# Patient Record
Sex: MALE | Race: WHITE | NOT HISPANIC OR LATINO | Employment: FULL TIME | URBAN - METROPOLITAN AREA
[De-identification: names, ages, dates, MRNs, and addresses within clinical notes are randomized per-mention and may not be internally consistent; named-entity substitution may affect disease eponyms.]

---

## 2017-12-06 ENCOUNTER — OFFICE VISIT (OUTPATIENT)
Dept: URGENT CARE | Facility: RETAIL CLINIC | Age: 27
End: 2017-12-06
Payer: COMMERCIAL

## 2017-12-06 VITALS — DIASTOLIC BLOOD PRESSURE: 77 MMHG | SYSTOLIC BLOOD PRESSURE: 122 MMHG | TEMPERATURE: 98 F | HEART RATE: 75 BPM

## 2017-12-06 DIAGNOSIS — L23.7 CONTACT DERMATITIS DUE TO POISON IVY: Primary | ICD-10-CM

## 2017-12-06 PROCEDURE — 99203 OFFICE O/P NEW LOW 30 MIN: CPT | Performed by: PHYSICIAN ASSISTANT

## 2017-12-06 RX ORDER — PREDNISONE 20 MG/1
TABLET ORAL
Qty: 14 TABLET | Refills: 0 | Status: SHIPPED | OUTPATIENT
Start: 2017-12-06 | End: 2017-12-18

## 2017-12-06 NOTE — PATIENT INSTRUCTIONS
Take oral steroid taper with food as directed  May take an oral over the counter antihistamine to help with itch for next 3-5 days (loratadine/Claritin, cetirizine/Zyrtec or Benadryl/diphenahydramine)  Wash area as soon as possible after contact with plant to avoid spread and reduce reaction. Washing in 5-10 minutes can prevent reaction but even if you wash 2 hours after exposure, you can significantly reduce the reaction.  Rash is not spread by fluid in the blisters.  Wash sheets, clothes and animals exposed to get rid of toxins.  Cool compresses, ice packs, cooler showers for itching relief.  Baking soda paste, calamine lotion or aveeno oatmeal packs for itching.  Rub with affected are with ice cube.  Avoid sunlight and heat.  Avoid scratching to prevent secondary infection.  Watch for any signs of infection - (fever, bright red color, more pain, discharge - yellow/white/green). Present to urgent care, your primary care clinic or ER if any of these signs develop to be evaluated.  Watch for other allergic reaction symptoms: including difficulty breathing, throat swelling and/or shortness of breath.  MUST follow up with primary care provider if not improving, worsening or new symptoms or for any adverse reactions to medications.

## 2017-12-06 NOTE — MR AVS SNAPSHOT
After Visit Summary   12/6/2017    Ross Dumas    MRN: [id number]           Patient Information     Date Of Birth          1990        Visit Information        Provider Department      12/6/2017 3:50 PM Rachele Ayala PA-C [facility]        Today's Diagnoses     Contact dermatitis due to poison ivy    -  1      Care Instructions    Take oral steroid taper with food as directed  May take an oral over the counter antihistamine to help with itch for next 3-5 days (loratadine/Claritin, cetirizine/Zyrtec or Benadryl/diphenahydramine)  Wash area as soon as possible after contact with plant to avoid spread and reduce reaction. Washing in 5-10 minutes can prevent reaction but even if you wash 2 hours after exposure, you can significantly reduce the reaction.  Rash is not spread by fluid in the blisters.  Wash sheets, clothes and animals exposed to get rid of toxins.  Cool compresses, ice packs, cooler showers for itching relief.  Baking soda paste, calamine lotion or aveeno oatmeal packs for itching.  Rub with affected are with ice cube.  Avoid sunlight and heat.  Avoid scratching to prevent secondary infection.  Watch for any signs of infection - (fever, bright red color, more pain, discharge - yellow/white/green). Present to urgent care, your primary care clinic or ER if any of these signs develop to be evaluated.  Watch for other allergic reaction symptoms: including difficulty breathing, throat swelling and/or shortness of breath.  MUST follow up with primary care provider if not improving, worsening or new symptoms or for any adverse reactions to medications.             Follow-ups after your visit        Who to contact     You can reach your care team any time of the day by calling [phone].  Notification of test results:  If you have an abnormal lab result, we will notify you by phone as soon as possible.         Additional Information About Your Visit       "  MyChart Information     [email or web address] lets you send messages to your doctor, view your test results, renew your prescriptions, schedule appointments and more. To sign up, go to [email or web address] . Click on \"Log in\" on the left side of the screen, which will take you to the Welcome page. Then click on \"Sign up Now\" on the right side of the page.     You will be asked to enter the access code listed below, as well as some personal information. Please follow the directions to create your username and password.     Your access code is: [email or web address]  Expires: 3/6/2018  4:15 PM     Your access code will  in 90 days. If you need help or a new code, please call your [facility] clinic or [phone].        Care EveryWhere ID     This is your Care EveryWhere ID. This could be used by other organizations to access your [facility] medical records  [id number]        Your Vitals Were     Pulse Temperature                75 98  F (36.7  C) (Temporal)           Blood Pressure from Last 3 Encounters:   17 122/77   12 125/74    Weight from Last 3 Encounters:   No data found for Wt              Today, you had the following     No orders found for display         Today's Medication Changes          These changes are accurate as of: 17  4:15 PM.  If you have any questions, ask your nurse or doctor.               Start taking these medicines.        Dose/Directions    predniSONE 20 MG tablet   Commonly known as:  DELTASONE   Used for:  Contact dermatitis due to poison ivy        Take 2 tablets once a day for 4 days, 1 tablet daily for 4 days, 1/2 tablet daily for 4 days   Quantity:  14 tablet   Refills:  0            Where to get your medicines      These medications were sent to [facility] - [place], [place] - [address] [address]  [address] [address] [address]     Phone:  [phone]     predniSONE 20 MG tablet                Primary Care Provider Fax #    Physician No Ref-Primary [phone]    "    No address on file        Equal Access to Services     RD ANDREA : Hadii jeffrey joyner lucía Sharma, wajadenda luqadaha, qaybta kaalmapro dobson, waxwarren natividad alfaro. So [facility] [phone].    ATENCIÓN: Si habla español, tiene a petty disposición servicios gratuitos de asistencia lingüística. Llame al [phone].    We comply with applicable federal civil rights laws and [place] laws. We do not discriminate on the basis of race, color, national origin, age, disability, sex, sexual orientation, or gender identity.            Thank you!     Thank you for choosing [facility]  for your care. Our goal is always to provide you with excellent care. Hearing back from our patients is one way we can continue to improve our services. Please take a few minutes to complete the written survey that you may receive in the mail after your visit with us. Thank you!             Your Updated Medication List - Protect others around you: Learn how to safely use, store and throw away your medicines at www.disposemymeds.org.          This list is accurate as of: 12/6/17  4:15 PM.  Always use your most recent med list.                   Brand Name Dispense Instructions for use Diagnosis    predniSONE 20 MG tablet    DELTASONE    14 tablet    Take 2 tablets once a day for 4 days, 1 tablet daily for 4 days, 1/2 tablet daily for 4 days    Contact dermatitis due to poison ivy

## 2017-12-06 NOTE — PROGRESS NOTES
Chief Complaint   Patient presents with     Derm Problem     rash around right eye x 3 days, no fevers      SUBJECTIVE:  Ross Dumas is a 27 year old male who presents to the clinic today for a rash.  Onset of rash was 2 day(s) ago.   Rash is gradual onset, still present and worsening.  Location of the rash: around his eye  Quality/symptoms of rash: swollen, itch  Symptoms are moderate and rash seems to be worsening.  Previous history of a similar rash? Yes: multiple times of poison ivy - mild cases this year on his arms - treats at home, washes off with soap/water, home remedies  Recent exposure history: poison ivy  Associated symptoms include: nothing.    No past medical history on file.  No current outpatient prescriptions on file.        Allergies   Allergen Reactions     Seasonal Allergies         History   Smoking Status     Never Smoker   Smokeless Tobacco     Never Used       ROS:  CONSTITUTIONAL:NEGATIVE for fever, chills  INTEGUMENTARY/SKIN: NEGATIVE for bruising  and POSITIVE for itchy/swollen rash on face  RESP:NEGATIVE for significant cough or wheezing    EXAM:   /77 (BP Location: Left arm)  Pulse 75  Temp 98  F (36.7  C) (Temporal)  GENERAL: alert, no acute distress.  SKIN: Rash description:    Distribution: localized  Location: bridge of nose and around right eye  Color: pink,  Lesion type: vesicular, blotchy, confluent with inflammation and excoriation  NECK: supple, non-tender to palpation, no adenopathy noted    ASSESSMENT:  (L23.7) Contact dermatitis due to poison ivy  (primary encounter diagnosis)    PLAN:  Plan: predniSONE (DELTASONE) 20 MG tablet - 12 day taper  Take oral steroid taper with food as directed  May take an oral over the counter antihistamine to help with itch for next 3-5 days (loratadine/Claritin, cetirizine/Zyrtec or Benadryl/diphenahydramine)  Wash area as soon as possible after contact with plant to avoid spread and reduce reaction. Washing in 5-10 minutes can  prevent reaction but even if you wash 2 hours after exposure, you can significantly reduce the reaction.  Rash is not spread by fluid in the blisters.  Wash sheets, clothes and animals exposed to get rid of toxins.  Cool compresses, ice packs, cooler showers for itching relief.  Baking soda paste, calamine lotion or aveeno oatmeal packs for itching.  Rub with affected are with ice cube.  Avoid sunlight and heat.  Avoid scratching to prevent secondary infection.  Watch for any signs of infection - (fever, bright red color, more pain, discharge - yellow/white/green). Present to urgent care, your primary care clinic or ER if any of these signs develop to be evaluated.  Watch for other allergic reaction symptoms: including difficulty breathing, throat swelling and/or shortness of breath.  MUST follow up with primary care provider if not improving, worsening or new symptoms or for any adverse reactions to medications.        Rachele Ayala PA-C  [facility]

## 2017-12-06 NOTE — NURSING NOTE
Chief Complaint   Patient presents with     Derm Problem     rash around right eye x 3 days, no fevers       Initial /77 (BP Location: Left arm)  Pulse 75  Temp 98  F (36.7  C) (Temporal) There is no height or weight on file to calculate BMI.  Medication Reconciliation: complete

## 2018-10-19 ENCOUNTER — OFFICE VISIT (OUTPATIENT)
Dept: FAMILY MEDICINE | Facility: OTHER | Age: 28
End: 2018-10-19
Payer: COMMERCIAL

## 2018-10-19 VITALS
WEIGHT: 211.8 LBS | BODY MASS INDEX: 32.1 KG/M2 | TEMPERATURE: 98.8 F | HEART RATE: 100 BPM | SYSTOLIC BLOOD PRESSURE: 114 MMHG | DIASTOLIC BLOOD PRESSURE: 82 MMHG | HEIGHT: 68 IN | RESPIRATION RATE: 16 BRPM

## 2018-10-19 DIAGNOSIS — G44.89 OTHER HEADACHE SYNDROME: Primary | ICD-10-CM

## 2018-10-19 DIAGNOSIS — R11.0 NAUSEA: ICD-10-CM

## 2018-10-19 LAB
ALBUMIN SERPL-MCNC: 4.3 G/DL (ref 3.4–5)
ALP SERPL-CCNC: 89 U/L (ref 40–150)
ALT SERPL W P-5'-P-CCNC: 45 U/L (ref 0–70)
ANION GAP SERPL CALCULATED.3IONS-SCNC: 11 MMOL/L (ref 3–14)
AST SERPL W P-5'-P-CCNC: 22 U/L (ref 0–45)
BILIRUB SERPL-MCNC: 0.6 MG/DL (ref 0.2–1.3)
BUN SERPL-MCNC: 12 MG/DL (ref 7–30)
CALCIUM SERPL-MCNC: 9 MG/DL (ref 8.5–10.1)
CHLORIDE SERPL-SCNC: 104 MMOL/L (ref 94–109)
CO2 SERPL-SCNC: 26 MMOL/L (ref 20–32)
CREAT SERPL-MCNC: 1.12 MG/DL (ref 0.66–1.25)
ERYTHROCYTE [DISTWIDTH] IN BLOOD BY AUTOMATED COUNT: 13 % (ref 10–15)
GFR SERPL CREATININE-BSD FRML MDRD: 78 ML/MIN/1.7M2
GLUCOSE SERPL-MCNC: 124 MG/DL (ref 70–99)
HCT VFR BLD AUTO: 45.7 % (ref 40–53)
HGB BLD-MCNC: 16.1 G/DL (ref 13.3–17.7)
MCH RBC QN AUTO: 29.4 PG (ref 26.5–33)
MCHC RBC AUTO-ENTMCNC: 35.2 G/DL (ref 31.5–36.5)
MCV RBC AUTO: 84 FL (ref 78–100)
PLATELET # BLD AUTO: 229 10E9/L (ref 150–450)
POTASSIUM SERPL-SCNC: 3.8 MMOL/L (ref 3.4–5.3)
PROT SERPL-MCNC: 8.3 G/DL (ref 6.8–8.8)
RBC # BLD AUTO: 5.47 10E12/L (ref 4.4–5.9)
SODIUM SERPL-SCNC: 141 MMOL/L (ref 133–144)
WBC # BLD AUTO: 8.5 10E9/L (ref 4–11)

## 2018-10-19 PROCEDURE — 80053 COMPREHEN METABOLIC PANEL: CPT | Performed by: NURSE PRACTITIONER

## 2018-10-19 PROCEDURE — 36415 COLL VENOUS BLD VENIPUNCTURE: CPT | Performed by: NURSE PRACTITIONER

## 2018-10-19 PROCEDURE — 85027 COMPLETE CBC AUTOMATED: CPT | Performed by: NURSE PRACTITIONER

## 2018-10-19 PROCEDURE — 99214 OFFICE O/P EST MOD 30 MIN: CPT | Performed by: NURSE PRACTITIONER

## 2018-10-19 RX ORDER — ONDANSETRON 8 MG/1
8 TABLET, FILM COATED ORAL EVERY 8 HOURS PRN
Qty: 9 TABLET | Refills: 0 | Status: SHIPPED | OUTPATIENT
Start: 2018-10-19 | End: 2020-10-08

## 2018-10-19 RX ORDER — SUMATRIPTAN 25 MG/1
25-50 TABLET, FILM COATED ORAL
Qty: 18 TABLET | Refills: 0 | Status: SHIPPED | OUTPATIENT
Start: 2018-10-19 | End: 2020-10-08

## 2018-10-19 ASSESSMENT — PAIN SCALES - GENERAL: PAINLEVEL: NO PAIN (1)

## 2018-10-19 NOTE — MR AVS SNAPSHOT
"              After Visit Summary   10/19/2018    Ross Dumas    MRN: [id number]           Patient Information     Date Of Birth          1990        Visit Information        Provider Department      10/19/2018 1:20 PM Carly Tracey APRN CNP [facility]        Today's Diagnoses     Other headache syndrome    -  1    Nausea          Care Instructions    Continue to sip water adding electrolyte beverage as able. Zofran for nausea. Imitrex for abortive of headache that is severe. Ibuprofen 600-800 mg 6 - 8 hours with food.     I will call you with your lab results and any further treatment as indicated.     Thank you  Carly Tracey CNP            Follow-ups after your visit        Who to contact     If you have questions or need follow up information about today's clinic visit or your schedule please contact [facility] directly at [phone].  Normal or non-critical lab and imaging results will be communicated to you by MyChart, letter or phone within 4 business days after the clinic has received the results. If you do not hear from us within 7 days, please contact the clinic through MyChart or phone. If you have a critical or abnormal lab result, we will notify you by phone as soon as possible.  Submit refill requests through [employer] or call your pharmacy and they will forward the refill request to us. Please allow 3 business days for your refill to be completed.          Additional Information About Your Visit        Care EveryWhere ID     This is your Care EveryWhere ID. This could be used by other organizations to access your [place] medical records  [id number]        Your Vitals Were     Pulse Temperature Respirations Height BMI (Body Mass Index)       100 98.8  F (37.1  C) (Temporal) 16 5' 7.79\" (1.722 m) 32.4 kg/m2        Blood Pressure from Last 3 Encounters:   10/19/18 114/82   12/06/17 122/77   04/03/12 125/74    Weight from Last 3 Encounters: "   10/19/18 211 lb 12.8 oz (96.1 kg)              We Performed the Following     CBC with platelets     Comprehensive metabolic panel (BMP + Alb, Alk Phos, ALT, AST, Total. Bili, TP)          Today's Medication Changes          These changes are accurate as of 10/19/18  2:04 PM.  If you have any questions, ask your nurse or doctor.               Start taking these medicines.        Dose/Directions    ondansetron 8 MG tablet   Commonly known as:  ZOFRAN   Used for:  Nausea   Started by:  Carly Tracey APRN CNP        Dose:  8 mg   Take 1 tablet (8 mg) by mouth every 8 hours as needed for nausea   Quantity:  9 tablet   Refills:  0       SUMAtriptan 25 MG tablet   Commonly known as:  IMITREX   Used for:  Other headache syndrome   Started by:  Carly Tracey APRN CNP        Dose:  25-50 mg   Take 1-2 tablets (25-50 mg) by mouth at onset of headache for migraine May repeat in 2 hours. Max 8 tablets/24 hours.   Quantity:  18 tablet   Refills:  0            Where to get your medicines      These medications were sent to [facility] [place], [place] - [address]   [address] [place] [facility] [address]     Phone:  [phone]     ondansetron 8 MG tablet    SUMAtriptan 25 MG tablet                Primary Care Provider Fax #    Physician No Ref-Primary [phone]       No address on file        Equal Access to Services     BARAK MENDEZ AH: Hadii jeffrey joyner hadasho Soomaali, waaxda luqadaha, qaybta kaalmada adeegyada, gabriel alfaro. So [facility] [phone].    ATENCIÓN: Si habla español, tiene a petty disposición servicios gratuitos de asistencia lingüística. Fauzia leiva [phone].    We comply with applicable federal civil rights laws and [place] laws. We do not discriminate on the basis of race, color, national origin, age, disability, sex, sexual orientation, or gender identity.            Thank you!     Thank you for choosing [facility]  your care. Our goal is always to provide you with excellent care. Hearing back from our patients is one way we can continue to improve our services. Please take a few minutes to complete the written survey that you may receive in the mail after your visit with us. Thank you!             Your Updated Medication List - Protect others around you: Learn how to safely use, store and throw away your medicines at www.disposemymeds.org.          This list is accurate as of 10/19/18  2:04 PM.  Always use your most recent med list.                   Brand Name Dispense Instructions for use Diagnosis    ondansetron 8 MG tablet    ZOFRAN    9 tablet    Take 1 tablet (8 mg) by mouth every 8 hours as needed for nausea    Nausea       SUMAtriptan 25 MG tablet    IMITREX    18 tablet    Take 1-2 tablets (25-50 mg) by mouth at onset of headache for migraine May repeat in 2 hours. Max 8 tablets/24 hours.    Other headache syndrome

## 2018-10-19 NOTE — PROGRESS NOTES
SUBJECTIVE:   Ross Dumas is a 28 year old male who presents to clinic today for the following health issues:    HPI  Headaches      Duration: a week and a half    Description  Location: unilateral in the right frontal area, unilateral in the right temporal area   Character: sharp pain  Frequency:  On and off, had a migraine last week Wednesday and threw up all day on Thursday.   Duration:  Thirty minutes.     Intensity:  mild    Accompanying signs and symptoms:    Precipitating or Alleviating factors:  Nausea/vomiting: yes, vomited on Thursday and end of the day on Friday.   Dizziness: occasionally.   Weakness or numbness: no  Visual changes: none  Fever: no   Sinus or URI symptoms no     History  Head trauma: no   Family history of migraines: not that he is aware.   Previous tests for headaches: no   Neurologist evaluations: no   Able to do daily activities when headache present: YES- for the most part.  Wake with headaches: YES on Monday and Wednesday this week.   Daily pain medication use: YES- tylenol and ibuprofen.   Any changes in: none    Precipitating or Alleviating factors (light/sound/sleep/caffeine): none     Therapies tried and outcome: Ibuprofen (Advil, Motrin) and Tylenol    Outcome - usually effective  Frequent/daily pain medication use: no     States he had history of bad headache after concussion when 17 y/o.   States symptoms of headache came on all of a sudden on drive home from work. Denies sensitivity to sound, smell or light. [place] area, severe, pain with movement.   State improved to tolerable after Nsaids 400 mg of ibuprofen. He was then able to drive his children home. He was able to work at home for some time then went to bed at 11 pm. He then took Imitrex he was not able to sleep due to nausea and vomiting. He vomited all night. Had chills/hot cold on and off all that day. He took imitrex again that night due to sever headache. The next day he was very nauseated but did not vomit.  Denies diarrhea. Headache was improving he took ibuprofen.   He has had a headache on and off all week with nausea for past 6 additional days and last night severe headache again this time it was on the right side of his head.     He is able to keep down saltine crackers and soup. He is sipping on water. Unable to keep water or other electrolytes down until earlier this week.     He typically drinks 5-6 bottles of water daily, coffee 1-2 cup in morning, typically eats good meals. Denies tobacco, rare etoh.         Problem list and histories reviewed & adjusted, as indicated.  Additional history: as documented    Patient Active Problem List   Diagnosis     Allergic conjunctivitis     Allergic rhinitis due to allergen     Biceps tendonitis on left     Seasonal allergies     Past Surgical History:   Procedure Laterality Date     HC KNEE SCOPE,AID ANT CRUCIATE REPAIR  11/23/2004    Ruptured ACL graft, hx of allograft ACL reconstruction with small 5 mm longitudinal tear, mid posterior aspect of lateral meniscus.       Social History   Substance Use Topics     Smoking status: Never Smoker     Smokeless tobacco: Never Used     Alcohol use No     History reviewed. No pertinent family history.      Current Outpatient Prescriptions   Medication Sig Dispense Refill     ondansetron (ZOFRAN) 8 MG tablet Take 1 tablet (8 mg) by mouth every 8 hours as needed for nausea 9 tablet 0     SUMAtriptan (IMITREX) 25 MG tablet Take 1-2 tablets (25-50 mg) by mouth at onset of headache for migraine May repeat in 2 hours. Max 8 tablets/24 hours. 18 tablet 0     Allergies   Allergen Reactions     Seasonal Allergies      BP Readings from Last 3 Encounters:   10/19/18 114/82   12/06/17 122/77   04/03/12 125/74    Wt Readings from Last 3 Encounters:   10/19/18 211 lb 12.8 oz (96.1 kg)                  Labs reviewed in EPIC    ROS:  Constitutional, HEENT, cardiovascular, pulmonary, gi and gu systems are negative, except as otherwise  "noted.    OBJECTIVE:     /82  Pulse 100  Temp 98.8  F (37.1  C) (Temporal)  Resp 16  Ht 5' 7.79\" (1.722 m)  Wt 211 lb 12.8 oz (96.1 kg)  BMI 32.4 kg/m2  Body mass index is 32.4 kg/(m^2).  GENERAL: healthy, alert and no distress  EYES: Eyes grossly normal to inspection, PERRL and conjunctivae and sclerae normal  HENT: ear canals and TM's normal, nose and mouth without ulcers or lesions  NECK: no adenopathy, no asymmetry, masses, or scars and thyroid normal to palpation  RESP: lungs clear to auscultation - no rales, rhonchi or wheezes  CV: regular rate and rhythm, normal S1 S2, no S3 or S4, no murmur, click or rub, no peripheral edema and peripheral pulses strong  ABDOMEN: soft, nontender, no hepatosplenomegaly, no masses and bowel sounds normal  MS: no gross musculoskeletal defects noted, no edema  SKIN: no suspicious lesions or rashes  NEURO: Normal strength and tone, mentation intact and speech normal  BACK: no CVA tenderness, no paralumbar tenderness  PSYCH: mentation appears normal, affect normal/bright        ASSESSMENT/PLAN:     1. Other headache syndrome  Unclear etiology suspect viral. Discussed home treatment will return to clinic if symptoms do not continue to improve.   - CBC with platelets  - Comprehensive metabolic panel (BMP + Alb, Alk Phos, ALT, AST, Total. Bili, TP)  - SUMAtriptan (IMITREX) 25 MG tablet; Take 1-2 tablets (25-50 mg) by mouth at onset of headache for migraine May repeat in 2 hours. Max 8 tablets/24 hours.  Dispense: 18 tablet; Refill: 0    2. Nausea    - ondansetron (ZOFRAN) 8 MG tablet; Take 1 tablet (8 mg) by mouth every 8 hours as needed for nausea  Dispense: 9 tablet; Refill: 0    Patient Instructions   Continue to sip water adding electrolyte beverage as able. Zofran for nausea. Imitrex for abortive of headache that is severe. Ibuprofen 600-800 mg 6 - 8 hours with food.     I will call you with your lab results and any further treatment as indicated.     Thank " you  Carly Tracey, LENARD BOOKER  [facility]

## 2018-10-19 NOTE — PATIENT INSTRUCTIONS
Continue to sip water adding electrolyte beverage as able. Zofran for nausea. Imitrex for abortive of headache that is severe. Ibuprofen 600-800 mg 6 - 8 hours with food.     I will call you with your lab results and any further treatment as indicated.     Thank you  Carly Tracey CNP

## 2018-10-22 ENCOUNTER — TELEPHONE (OUTPATIENT)
Dept: FAMILY MEDICINE | Facility: OTHER | Age: 28
End: 2018-10-22

## 2018-10-22 NOTE — PROGRESS NOTES
Please advise Ross Dumas,  1990, that his lab results were normal electrolytes, glucose is mildly elevated at 124 (normal is 70-99). Normal kidney and liver function. Complete blood count is normal without indication of infection or anemia.   [phone] (home)   Thank you  Carly Tracey CNP

## 2018-10-22 NOTE — TELEPHONE ENCOUNTER
LM asking patient to return our call.  Please inform him of the message below.  Soren Mccallum, SHYANN    Please advise Ross Dumas,  1990, that his lab results were normal electrolytes, glucose is mildly elevated at 124 (normal is 70-99). Normal kidney and liver function. Complete blood count is normal without indication of infection or anemia.   [phone] (home)   Thank you   Carly Tracey CNP

## 2019-05-02 ENCOUNTER — OFFICE VISIT (OUTPATIENT)
Dept: URGENT CARE | Facility: RETAIL CLINIC | Age: 29
End: 2019-05-02
Payer: COMMERCIAL

## 2019-05-02 VITALS
HEART RATE: 75 BPM | TEMPERATURE: 98.3 F | OXYGEN SATURATION: 97 % | DIASTOLIC BLOOD PRESSURE: 90 MMHG | SYSTOLIC BLOOD PRESSURE: 125 MMHG

## 2019-05-02 DIAGNOSIS — L23.7 CONTACT DERMATITIS DUE TO POISON IVY: Primary | ICD-10-CM

## 2019-05-02 PROCEDURE — 99213 OFFICE O/P EST LOW 20 MIN: CPT | Performed by: INTERNAL MEDICINE

## 2019-05-02 RX ORDER — PREDNISONE 20 MG/1
20 TABLET ORAL DAILY
Qty: 5 TABLET | Refills: 0 | Status: SHIPPED | OUTPATIENT
Start: 2019-05-02 | End: 2019-05-30

## 2019-05-02 NOTE — PROGRESS NOTES
[facility] care Progress Note        Susan Rahman MD, MPH  05/02/2019    Ross Dumas is a pleasant  29 year old male seen for rash involving bilateral hands and forearms as well as lower legs and to a lesser extent face for 4 days . Patient was removing brushes from his mouse 6 days ago and thinks he was exposed to poison IVY.There has not been any change in the diet or new detergent, soap or toiletries.  No new medications, no insect bite. No fever or chills and no recent illness. No cough or shortness of breath or wheezing is referred.  No nausea, vomiting or diarrhea.  No arthralgia or myalgia.  No tongue, lip or mouth swelling or swallowing problems are referred.    Physical Exam   /90   Pulse 75   Temp 98.3  F (36.8  C) (Oral)   SpO2 97%      Constitutional: Patient is in no distress The patient is pleasant and cooperative.   HEENT: Head:  Head is atraumatic, normocephalic.    Eyes: Pupils are equal, round and reactive to light and accomodation.  Sclera is non-icteric. No conjunctival injection, or exudate noted. Extraocular motion is intact. Visual acuity is intact bilaterally.  Ears:  External acoustic canals are patent and clear.  There is no erythema and bulging( exudate)  of the ( R/L ) tympanic membrane(s ).   Nose:  No Nasal congestion w/o drainage or mucosal ulceration is noted.  Throat:  Oral mucosa is moist.  No oral lesions are noted.  No posterior pharyngeal hyperemia or exudate noted.     Neck Supple.  There is no cervical lymphadenopathy.  No nuchal rigidity noted.  There is no meningismus.     Cardiovascular: Heart is regular to rate and rhythm.  No murmur is noted.     Chest. Chest Symmetrical, no soft tissues, swelling, or tenderness upon palpation   Lungs: Clear in the anterior and posterior pulmonary fields.   Abdomen: Soft and non-tender.    Back No flank tenderness is noted.   Extremeties No edema, no calf tenderness.   Neuro: No focal deficit.   Skin No  petechiae or purpura is noted.  There is excoriation of the skin of bilateral hands ( dorsally) and forearms ( extensor surfaces) and the anterior surface of bilateral lower legs and to a lesser extent face (cheeks) sparing periocular skin. There is no pustules, and no vesicular eruption. No dermatomal pattern of distribution of the rash. No crusty or exudative skin lesions. No ulcerations. No lymphangitis.      Mood Normal         Assessment & Plan        Contact dermatitis due to poison ivy    - predniSONE (DELTASONE) 20 MG tablet; Take 20 mg by mouth daily for 5 days.  Dispense: 5 tablet; Refill: 0   discussed potential adverse effects of prednisone w the patient.    Follow-up with your PCP in 4-5 days, earlier if symptoms worsen.  Advised to use plenty of moisturizing cream.  Advised to avoid hot baths/showers.  Advised to avoid irritating soap/detergents.  Avoid warm environments.  Use Benedryl every 8 hours as needed for pruritis ( discussed the sedative nature of benadryl and advised patient to avoid operating equipment/machinery and caution with driving is advised ).  Please wash the skin with soap and water daily.

## 2019-05-28 NOTE — PROGRESS NOTES
"Subjective     Ross Dumas is a 29 year old male who presents to clinic today for the following health issues:    History of Present Illness     Anxiety symptoms  The patient is not having other symptoms associated with anxiety.  Any significant life events: relationship concerns  Patient is feeling anxious or having panic attacks.  Patient has no concerns about alcohol or drug use.      Social History  Tobacco Use    Smoking status: Never Smoker    Smokeless tobacco: Never Used  Alcohol use: No  Drug use: No      Today's PHQ-9         PHQ-9 Total Score:     15   PHQ-9 Q9 Thoughts of better off dead/self-harm past 2 weeks :   Not at all   Thoughts of suicide or self harm:      Self-harm Plan:        Self-harm Action:          Safety concerns for self or others:         Abnormal Mood Symptoms  Onset: last couple weeks    Description:   Depression: no  Anxiety: YES    Accompanying Signs & Symptoms:  Still participating in activities that you used to enjoy: YES   Fatigue: YES  Irritability: YES  Difficulty concentrating: YES  Changes in appetite: YES- \"maybe eating less\"  Problems with sleep: YES - troubles falling asleep - waking up at 1:30 AM even with wife's sleeping pills (trazadone)  Heart racing/beating fast : YES  Thoughts of hurting yourself or others: none    History:   Recent stress: YES- job is stressful, family things  Prior depression hospitalization: None  Family history of depression: no  Family history of anxiety: no    Precipitating factors:   Alcohol/drug use: no    Alleviating factors:  Sertraline possibly (wife's medication - when started taking that, that's when he started waking up in the middle of the night)    Therapies Tried and outcome: Wife's medication - Zoloft (Sertraline) and Trazodone     He states that for the past 3 weeks, he has had increased anxiety and difficulty falling asleep and staying asleep. It all stemmed from news he was recently told regarding some things in his childhood " "that he never knew about and he does not wish to discuss details but this has taken a big toll on him. His mind is constantly racing throughout the day and he has been waking up at 1:30 every night for the past week or so. He started his wife's Zoloft approximately 1 week ago and thinks this is helping with his anxiety symptoms but he was not waking up part way through the night before starting this medication. He has tried his wife's trazodone without benefit. He did take Benadryl a couple night and he was able to fall back asleep after about an hour. He denies depression or thoughts of self harm. He is interested in counseling. He denies any substance abuse. He drinks alcohol occasionally on the weekends.     Answers for HPI/ROS submitted by the patient on 5/30/2019   Chronic problems general questions HPI Form  If you checked off any problems, how difficult have these problems made it for you to do your work, take care of things at home, or get along with other people?: Somewhat difficult  PHQ9 TOTAL SCORE: 15  OLIVIER 7 TOTAL SCORE: 18    Reviewed and updated as needed this visit by Provider  FH/SH/PMH reviewed    Review of Systems   GENERAL: Denies fever, fatigue, weakness, weight gain, or weight loss.  CARDIOVASCULAR: Denies chest pain, shortness of breath, irregular heartbeats,  palpitations, or edema.  RESPIRATORY: Denies cough, hemoptysis, and shortness of breath.  NEUROLOGIC: Denies headache, fainting, dizziness, memory loss, numbness, tingling, or seizures.  PSYCHIATRIC:  +Anxiety, difficulty sleeping. Denies depression or thoughts of suicide.        Objective    /76   Pulse 108   Temp 97.6  F (36.4  C) (Temporal)   Resp 16   Ht 1.715 m (5' 7.52\")   Wt 91.6 kg (202 lb)   SpO2 97%   BMI 31.15 kg/m    Body mass index is 31.15 kg/m .  Physical Exam   GENERAL: healthy, alert and no distress  RESP: lungs clear to auscultation - no rales, rhonchi or wheezes  CV: regular rate and rhythm, normal S1 S2, " no S3 or S4, no murmur, click or rub  NEURO: Normal strength and tone, mentation intact and speech normal. Gait is stable.   PSYCH: mentation appears normal, affect normal/bright        Assessment & Plan       ICD-10-CM    1. Adjustment disorder with anxious mood F43.22 sertraline (ZOLOFT) 50 MG tablet     hydrOXYzine (VISTARIL) 50 MG capsule   2. Psychophysiological insomnia F51.04    3. Elevated glucose R73.09 Hemoglobin A1c       1-2. His symptoms are consistent with an adjustment disorder with anxiety due to recent information he was told regarding his childhood. He has already started his wife's sertraline which has been working well, although he has been waking up part way through the night since starting this but this could be related to his anxiety rather than a medication side effect. I recommend he continue taking the sertraline 50 mg daily and will fill a prescription for him. Common side effects discussed and he was notified that this can take 4-6 weeks to take full effect. He will continue taking this in the mornings and for his insomnia symptoms, will start him on Vistaril  mg nightly which should help calm down his anxious thoughts as well. If insomnia is not improving, may need to consider a different SSRI. I also recommend counseling and his states his wife sees someone locally so he will check into this. If he needs a referral, he will let me know. Will plan on follow up in 6 weeks for a recheck.    3. His glucose was elevated at 124 in October and he states he was fasting during that visit so A1c ordered today and found to be normal.       Return in about 6 weeks (around 7/11/2019) for Routine Visit.    Rikki Strange PA-C  [facility]

## 2019-05-30 ENCOUNTER — OFFICE VISIT (OUTPATIENT)
Dept: FAMILY MEDICINE | Facility: OTHER | Age: 29
End: 2019-05-30
Payer: COMMERCIAL

## 2019-05-30 VITALS
OXYGEN SATURATION: 97 % | RESPIRATION RATE: 16 BRPM | HEIGHT: 68 IN | BODY MASS INDEX: 30.62 KG/M2 | TEMPERATURE: 97.6 F | DIASTOLIC BLOOD PRESSURE: 76 MMHG | SYSTOLIC BLOOD PRESSURE: 120 MMHG | HEART RATE: 108 BPM | WEIGHT: 202 LBS

## 2019-05-30 DIAGNOSIS — F51.04 PSYCHOPHYSIOLOGICAL INSOMNIA: ICD-10-CM

## 2019-05-30 DIAGNOSIS — F43.22 ADJUSTMENT DISORDER WITH ANXIOUS MOOD: Primary | ICD-10-CM

## 2019-05-30 DIAGNOSIS — R73.09 ELEVATED GLUCOSE: ICD-10-CM

## 2019-05-30 LAB — HBA1C MFR BLD: 5.1 % (ref 0–5.6)

## 2019-05-30 PROCEDURE — 83036 HEMOGLOBIN GLYCOSYLATED A1C: CPT | Performed by: PHYSICIAN ASSISTANT

## 2019-05-30 PROCEDURE — 99214 OFFICE O/P EST MOD 30 MIN: CPT | Performed by: PHYSICIAN ASSISTANT

## 2019-05-30 PROCEDURE — 36415 COLL VENOUS BLD VENIPUNCTURE: CPT | Performed by: PHYSICIAN ASSISTANT

## 2019-05-30 RX ORDER — HYDROXYZINE PAMOATE 50 MG/1
50-100 CAPSULE ORAL
Qty: 30 CAPSULE | Refills: 2 | Status: SHIPPED | OUTPATIENT
Start: 2019-05-30 | End: 2020-10-08

## 2019-05-30 ASSESSMENT — PAIN SCALES - GENERAL: PAINLEVEL: NO PAIN (0)

## 2019-05-30 ASSESSMENT — ANXIETY QUESTIONNAIRES
1. FEELING NERVOUS, ANXIOUS, OR ON EDGE: NEARLY EVERY DAY
2. NOT BEING ABLE TO STOP OR CONTROL WORRYING: NEARLY EVERY DAY
4. TROUBLE RELAXING: NEARLY EVERY DAY
6. BECOMING EASILY ANNOYED OR IRRITABLE: SEVERAL DAYS
3. WORRYING TOO MUCH ABOUT DIFFERENT THINGS: NEARLY EVERY DAY
7. FEELING AFRAID AS IF SOMETHING AWFUL MIGHT HAPPEN: NEARLY EVERY DAY
5. BEING SO RESTLESS THAT IT IS HARD TO SIT STILL: MORE THAN HALF THE DAYS
GAD7 TOTAL SCORE: 18
7. FEELING AFRAID AS IF SOMETHING AWFUL MIGHT HAPPEN: NEARLY EVERY DAY
GAD7 TOTAL SCORE: 18
GAD7 TOTAL SCORE: 18

## 2019-05-30 ASSESSMENT — PATIENT HEALTH QUESTIONNAIRE - PHQ9
SUM OF ALL RESPONSES TO PHQ QUESTIONS 1-9: 15
10. IF YOU CHECKED OFF ANY PROBLEMS, HOW DIFFICULT HAVE THESE PROBLEMS MADE IT FOR YOU TO DO YOUR WORK, TAKE CARE OF THINGS AT HOME, OR GET ALONG WITH OTHER PEOPLE: SOMEWHAT DIFFICULT
SUM OF ALL RESPONSES TO PHQ QUESTIONS 1-9: 15

## 2019-05-30 ASSESSMENT — MIFFLIN-ST. JEOR: SCORE: 1848.15

## 2019-05-30 NOTE — PATIENT INSTRUCTIONS
Will have you continue with Zoloft 50 mg daily to help with your anxiety symptoms.  This can take 4-6 weeks to take full effect.  Continue taking in the mornings with food.  Will also prescribe Vistaril to take nightly before bed to help you fall asleep and stay asleep.  If symptoms are not improving, let me know.    I recommend counseling so if you need a referral let me know.    Follow up in 6 weeks for a recheck.

## 2019-05-31 ASSESSMENT — ANXIETY QUESTIONNAIRES: GAD7 TOTAL SCORE: 18

## 2019-06-06 ENCOUNTER — TELEPHONE (OUTPATIENT)
Dept: FAMILY MEDICINE | Facility: OTHER | Age: 29
End: 2019-06-06

## 2019-06-06 NOTE — TELEPHONE ENCOUNTER
Copied from LOV 5/30/19:    -Will have you continue with Zoloft 50 mg daily to help with your anxiety symptoms.  This can take 4-6 weeks to take full effect.  Continue taking in the mornings with food.  Will also prescribe Vistaril to take nightly before bed to help you fall asleep and stay asleep.  If symptoms are not improving, let me know.     I recommend counseling so if you need a referral let me know.     Follow up in 6 weeks for a recheck.-    Patient has only been on medication for 1 week, do you want him to try it for longer before considering a medication change?

## 2019-06-06 NOTE — TELEPHONE ENCOUNTER
Patient called today.    Patient states Sleep medication [facility] by [facility] is not working.    Please contact patient.    Thank you.    Central Scheduling  Bety MUNOZ

## 2019-06-06 NOTE — TELEPHONE ENCOUNTER
Has he tried 2 capsules instead of just 1? I would recommend trying this with melatonin and giving it another week.    Rikki Strange PA-C

## 2020-10-07 NOTE — PROGRESS NOTES
Subjective     Ross Dumas is a 30 year old male who presents to clinic today for the following health issues:    History of Present Illness       Back Pain:  He presents for follow up of back pain. Patient's back pain is a recurring problem.  Location of back pain:  Right lower back and left lower back  Description of back pain: dull ache  Back pain spreads: right thigh and left thigh    Since patient first noticed back pain, pain is: always present, but gets better and worse  Does back pain interfere with his job:  Yes      He eats 2-3 servings of fruits and vegetables daily.He exercises with enough effort to increase his heart rate 20 to 29 minutes per day.  He exercises with enough effort to increase his heart rate 4 days per week.   He is taking medications regularly.      He has noticed intermittent pain in his bilateral back, worse on the right side over the past 4 years but it has been more persistent over the past few months. It will be quite painful for a few days and then improve. The pain usually goes into his bilateral, lateral hips as well and for 4-5 months he had left hip numbness approximately 1 year ago but then this resolved. He has occasional pain into both anterior thighs but not past the knees. He denies current numbness, tingling, weakness or loss of bowel/bladder control. He does not like to take over the counter medications but heat has helped. His most recently flare was on Sunday and symptoms have slowly improved over the week.         Answers for HPI/ROS submitted by the patient on 10/8/2020   Chronic problems general questions HPI Form  If you checked off any problems, how difficult have these problems made it for you to do your work, take care of things at home, or get along with other people?: Not difficult at all  PHQ9 TOTAL SCORE: 3  OLIVIER 7 TOTAL SCORE: 1    Review of Systems   GENERAL: Denies fever, fatigue, weakness, weight gain, or weight loss.  CARDIOVASCULAR: Denies chest  "pain, shortness of breath, irregular heartbeats, palpitations, or edema.  RESPIRATORY: Denies cough, hemoptysis, and shortness of breath.  MUSCULOSKELETAL: +Bilateral low back and hip pain.   NEUROLOGIC: Denies headache, fainting, dizziness, memory loss, numbness, tingling, or seizures.        Objective    /78   Pulse 71   Temp 97.6  F (36.4  C) (Temporal)   Resp 16   Ht 1.715 m (5' 7.5\")   Wt 99.8 kg (220 lb)   SpO2 98%   BMI 33.95 kg/m    Body mass index is 33.95 kg/m .  Physical Exam   GENERAL: healthy, alert and no distress  RESP: lungs clear to auscultation - no rales, rhonchi or wheezes  CV: regular rate and rhythm, normal S1 S2, no S3 or S4, no murmur, click or rub  MS: no gross musculoskeletal defects noted. No spinal tenderness. Mild tenderness over the far lateral, bilateral lumbar paraspinal musculature. Tenderness over the right greater trochanter with some tenderness over the right ASIS. Full lumbar range of motion although some increased pain noted with lumbar extension. Negative straight leg raise bilaterally.   NEURO: Normal strength and tone, mentation intact and speech normal. Cranial nerves II-XII are grossly intact. DTRs are 2+/4 throughout and symmetric. Gait is stable.           Assessment & Plan     ICD-10-CM    1. Acute bilateral low back pain with bilateral sciatica  M54.42 cyclobenzaprine (FLEXERIL) 5 MG tablet    M54.41 XR Lumbar Spine 2/3 Views   2. Bilateral hip pain  M25.551 cyclobenzaprine (FLEXERIL) 5 MG tablet    M25.552 XR Pelvis 1/2 Views       Symptoms suspicious for lumbar DDD and possible lumbar nerve impingement. He also has some hip pain, consistent in part with trochanteric bursitis but will order a pelvic x-ray to rule out any bony abnormalities along with a lumbar x-ray.  I recommend he take ibuprofen up to 800 mg every 8 hours when pain flares. I also recommend stretching and heat.  Depending on x-ray results, will likely refer him to [facility] physical " therapy.  Will prescribe Flexeril to take as needed for muscle spasms.   If symptoms worsen, will consider a lumbar MRI.    Rikki Strange PA-C  [facility]

## 2020-10-08 ENCOUNTER — OFFICE VISIT (OUTPATIENT)
Dept: FAMILY MEDICINE | Facility: OTHER | Age: 30
End: 2020-10-08
Payer: COMMERCIAL

## 2020-10-08 ENCOUNTER — ANCILLARY PROCEDURE (OUTPATIENT)
Dept: GENERAL RADIOLOGY | Facility: OTHER | Age: 30
End: 2020-10-08
Attending: PHYSICIAN ASSISTANT
Payer: COMMERCIAL

## 2020-10-08 VITALS
HEIGHT: 68 IN | OXYGEN SATURATION: 98 % | RESPIRATION RATE: 16 BRPM | HEART RATE: 71 BPM | WEIGHT: 220 LBS | SYSTOLIC BLOOD PRESSURE: 124 MMHG | DIASTOLIC BLOOD PRESSURE: 78 MMHG | TEMPERATURE: 97.6 F | BODY MASS INDEX: 33.34 KG/M2

## 2020-10-08 DIAGNOSIS — M25.551 BILATERAL HIP PAIN: ICD-10-CM

## 2020-10-08 DIAGNOSIS — M54.42 ACUTE BILATERAL LOW BACK PAIN WITH BILATERAL SCIATICA: Primary | ICD-10-CM

## 2020-10-08 DIAGNOSIS — M54.41 ACUTE BILATERAL LOW BACK PAIN WITH BILATERAL SCIATICA: Primary | ICD-10-CM

## 2020-10-08 DIAGNOSIS — M25.552 BILATERAL HIP PAIN: ICD-10-CM

## 2020-10-08 PROCEDURE — 72100 X-RAY EXAM L-S SPINE 2/3 VWS: CPT | Performed by: RADIOLOGY

## 2020-10-08 PROCEDURE — 99213 OFFICE O/P EST LOW 20 MIN: CPT | Performed by: PHYSICIAN ASSISTANT

## 2020-10-08 PROCEDURE — 72170 X-RAY EXAM OF PELVIS: CPT | Performed by: RADIOLOGY

## 2020-10-08 RX ORDER — CYCLOBENZAPRINE HCL 5 MG
5-10 TABLET ORAL 3 TIMES DAILY PRN
Qty: 30 TABLET | Refills: 1 | Status: SHIPPED | OUTPATIENT
Start: 2020-10-08 | End: 2023-06-13

## 2020-10-08 ASSESSMENT — ANXIETY QUESTIONNAIRES
2. NOT BEING ABLE TO STOP OR CONTROL WORRYING: NOT AT ALL
3. WORRYING TOO MUCH ABOUT DIFFERENT THINGS: NOT AT ALL
6. BECOMING EASILY ANNOYED OR IRRITABLE: NOT AT ALL
GAD7 TOTAL SCORE: 1
GAD7 TOTAL SCORE: 1
7. FEELING AFRAID AS IF SOMETHING AWFUL MIGHT HAPPEN: NOT AT ALL
5. BEING SO RESTLESS THAT IT IS HARD TO SIT STILL: NOT AT ALL
7. FEELING AFRAID AS IF SOMETHING AWFUL MIGHT HAPPEN: NOT AT ALL
GAD7 TOTAL SCORE: 1
4. TROUBLE RELAXING: SEVERAL DAYS
1. FEELING NERVOUS, ANXIOUS, OR ON EDGE: NOT AT ALL

## 2020-10-08 ASSESSMENT — PATIENT HEALTH QUESTIONNAIRE - PHQ9
SUM OF ALL RESPONSES TO PHQ QUESTIONS 1-9: 3
10. IF YOU CHECKED OFF ANY PROBLEMS, HOW DIFFICULT HAVE THESE PROBLEMS MADE IT FOR YOU TO DO YOUR WORK, TAKE CARE OF THINGS AT HOME, OR GET ALONG WITH OTHER PEOPLE: NOT DIFFICULT AT ALL
SUM OF ALL RESPONSES TO PHQ QUESTIONS 1-9: 3

## 2020-10-08 ASSESSMENT — MIFFLIN-ST. JEOR: SCORE: 1924.47

## 2020-10-08 NOTE — PATIENT INSTRUCTIONS
Will order an x-ray of your hips and low back for further evaluation.  You may have some dehydrated discs in your back.  Depending on results, will likely refer you to physical therapy.  I recommend ibuprofen up to 800 mg every 8 hours when symptoms occur.  Continue with heat as needed.  Will prescribe Flexeril to use as needed for muscle spasms.  If worsening, let me know.

## 2020-10-09 ENCOUNTER — TELEPHONE (OUTPATIENT)
Dept: FAMILY MEDICINE | Facility: OTHER | Age: 30
End: 2020-10-09

## 2020-10-09 DIAGNOSIS — M25.552 BILATERAL HIP PAIN: ICD-10-CM

## 2020-10-09 DIAGNOSIS — M54.42 ACUTE BILATERAL LOW BACK PAIN WITH BILATERAL SCIATICA: Primary | ICD-10-CM

## 2020-10-09 DIAGNOSIS — M25.551 BILATERAL HIP PAIN: ICD-10-CM

## 2020-10-09 DIAGNOSIS — M54.41 ACUTE BILATERAL LOW BACK PAIN WITH BILATERAL SCIATICA: Primary | ICD-10-CM

## 2020-10-09 ASSESSMENT — PATIENT HEALTH QUESTIONNAIRE - PHQ9: SUM OF ALL RESPONSES TO PHQ QUESTIONS 1-9: 3

## 2020-10-09 ASSESSMENT — ANXIETY QUESTIONNAIRES: GAD7 TOTAL SCORE: 1

## 2020-10-09 NOTE — TELEPHONE ENCOUNTER
----- Message from Rikki Strange PA-C sent at 10/9/2020 12:34 PM CDT -----  Please call and notify patient that his lumbar and pelvic x-rays are both normal with no abnormal findings. His symptoms are likely muscular although there could be some nerve irritation not seen on x-ray. I recommend physical therapy as we discussed so will place a referral to [facility] and they will call him to schedule.    Rkiki Strange PA-C

## 2021-02-25 NOTE — TELEPHONE ENCOUNTER
Medication:    Outpatient Medications Marked as Taking for the 2/25/21 encounter (Refill) with Ginette Rivers MD   Medication Sig Dispense Refill   • amphetamine-dextroamphetamine (Adderall) 10 MG tablet Take 1 tablet by mouth daily as needed (focus). 30 tablet 0   • amphetamine-dextroamphetamine (ADDERALL XR) 25 MG 24 hr capsule Take 1 capsule by mouth daily. Do not start before December 28, 2020. 30 capsule 0       Message to Prescriber:     [x] Pharmacy has been verified.    [x] Patient completely out of medication (*Route encounter as high priority if checked)    [] Patient informed refill request can take up to 3 business days to be processed    Patient currently has follow up appointment scheduled       Patient returned call and received message below, will close encounter    Milena Jones  Reception/ Scheduling

## 2023-01-09 ENCOUNTER — TRANSFERRED RECORDS (OUTPATIENT)
Dept: HEALTH INFORMATION MANAGEMENT | Facility: CLINIC | Age: 33
End: 2023-01-09

## 2023-05-30 ENCOUNTER — HOSPITAL ENCOUNTER (EMERGENCY)
Facility: CLINIC | Age: 33
Discharge: HOME | End: 2023-05-30
Attending: PHYSICIAN ASSISTANT | Admitting: PHYSICIAN ASSISTANT
Payer: COMMERCIAL

## 2023-05-30 VITALS
WEIGHT: 185 LBS | SYSTOLIC BLOOD PRESSURE: 115 MMHG | BODY MASS INDEX: 28.55 KG/M2 | DIASTOLIC BLOOD PRESSURE: 84 MMHG | RESPIRATION RATE: 16 BRPM | OXYGEN SATURATION: 99 % | HEART RATE: 84 BPM | TEMPERATURE: 98 F

## 2023-05-30 DIAGNOSIS — M62.830 SPASM OF THORACIC BACK MUSCLE: ICD-10-CM

## 2023-05-30 DIAGNOSIS — M62.838 NECK MUSCLE SPASM: ICD-10-CM

## 2023-05-30 DIAGNOSIS — R07.89 CHEST WALL PAIN: ICD-10-CM

## 2023-05-30 PROCEDURE — 99284 EMERGENCY DEPT VISIT MOD MDM: CPT

## 2023-05-30 PROCEDURE — 99284 EMERGENCY DEPT VISIT MOD MDM: CPT | Performed by: PHYSICIAN ASSISTANT

## 2023-05-30 PROCEDURE — 96372 THER/PROPH/DIAG INJ SC/IM: CPT | Performed by: PHYSICIAN ASSISTANT

## 2023-05-30 PROCEDURE — 250N000011 HC RX IP 250 OP 636: Performed by: PHYSICIAN ASSISTANT

## 2023-05-30 RX ORDER — CYCLOBENZAPRINE HCL 10 MG
10 TABLET ORAL 3 TIMES DAILY PRN
Qty: 15 TABLET | Refills: 0 | Status: SHIPPED | OUTPATIENT
Start: 2023-05-30 | End: 2023-06-13

## 2023-05-30 RX ORDER — KETOROLAC TROMETHAMINE 30 MG/ML
30 INJECTION, SOLUTION INTRAMUSCULAR; INTRAVENOUS ONCE
Status: COMPLETED | OUTPATIENT
Start: 2023-05-30 | End: 2023-05-30

## 2023-05-30 RX ORDER — ORPHENADRINE CITRATE 30 MG/ML
60 INJECTION INTRAMUSCULAR; INTRAVENOUS ONCE
Status: COMPLETED | OUTPATIENT
Start: 2023-05-30 | End: 2023-05-30

## 2023-05-30 RX ADMIN — ORPHENADRINE CITRATE 60 MG: 60 INJECTION INTRAMUSCULAR; INTRAVENOUS at 21:24

## 2023-05-30 RX ADMIN — KETOROLAC TROMETHAMINE 30 MG: 30 INJECTION, SOLUTION INTRAMUSCULAR at 21:27

## 2023-05-30 ASSESSMENT — ACTIVITIES OF DAILY LIVING (ADL): ADLS_ACUITY_SCORE: 35

## 2023-05-31 ENCOUNTER — NURSE TRIAGE (OUTPATIENT)
Dept: FAMILY MEDICINE | Facility: OTHER | Age: 33
End: 2023-05-31
Payer: COMMERCIAL

## 2023-05-31 NOTE — TELEPHONE ENCOUNTER
Reason for Disposition    MODERATE neck pain (e.g., interferes with normal activities like work or school)    Additional Information    Negative: Shock suspected (e.g., cold/pale/clammy skin, too weak to stand, low BP, rapid pulse)    Negative: Similar pain previously and it was from 'heart attack'    Negative: Similar pain previously from 'angina' and not relieved by nitroglycerin    Negative: Difficult to awaken or acting confused (e.g., disoriented, slurred speech)    Negative: Sounds like a life-threatening emergency to the triager    Negative: Followed an injury to neck (e.g., MVA, sports, impact or collision)    Negative: Chest pain    Negative: Lymph node in the neck is swollen or painful to the touch    Negative: Sore throat is main symptom    Negative: Difficulty breathing or unusual sweating (e.g., sweating without exertion)    Negative: Chest pain lasting longer than 5 minutes    Negative: Stiff neck (can't touch chin to chest) and has headache    Negative: Stiff neck (can't touch chin to chest) and fever    Negative: Weakness of an arm or hand    Negative: Problems with bowel or bladder control    Negative: Patient sounds very sick or weak to the triager    Negative: SEVERE pain (e.g., excruciating, unable to do any normal activities)    Negative: Head is twisting to one side (or ask 'is it turning against your will?')    Negative: Fever > 103 F (39.4 C)    Negative: Fever > 100.0 F (37.8 C) and Intravenous Drug Use (IVDU)    Negative: Fever > 100.0 F (37.8 C) and has diabetes mellitus or a weak immune system (e.g., HIV positive, cancer chemotherapy, organ transplant, splenectomy, chronic steroids)    Negative: Numbness in an arm or hand (i.e., loss of sensation)    Negative: Painful rash with multiple small blisters grouped together (i.e., dermatomal distribution or 'band' or 'stripe')    Negative: High-risk adult (e.g., history of cancer, HIV, or IV Drug Use)    Negative: Patient wants to be  "seen    Negative: Tenderness in front of neck over windpipe    Answer Assessment - Initial Assessment Questions  1. ONSET: \"When did the pain begin?\"       States onset a few days ago.  Says that it was gradual; started with feeling slightly stiff and then worsened  2. LOCATION: \"Where does it hurt?\"       Reports right shoulder and neck area; also upper back and chest all the way across   3. PATTERN \"Does the pain come and go, or has it been constant since it started?\"       States has been constant.  Monday states could barely move and then yesterday was worse; went to urgent care and then to ED yesterday.    4. SEVERITY: \"How bad is the pain?\"  (Scale 1-10; or mild, moderate, severe)    - NO PAIN (0): no pain or only slight stiffness     - MILD (1-3): doesn't interfere with normal activities     - MODERATE (4-7): interferes with normal activities or awakens from sleep     - SEVERE (8-10):  excruciating pain, unable to do any normal activities       Rates  At 8-10 yesterday when went in.  States now at a 7-8  5. RADIATION: \"Does the pain go anywhere else, shoot into your arms?\"      See above.  Does not go down arm  6. CORD SYMPTOMS: \"Any weakness or numbness of the arms or legs?\"      no  7. CAUSE: \"What do you think is causing the neck pain?\"      Unknown.  He thinks is muscular in nature; onset after playing with his kids  8. NECK OVERUSE: \"Any recent activities that involved turning or twisting the neck?\"      No; other than playing with kids  9. OTHER SYMPTOMS: \"Do you have any other symptoms?\" (e.g., headache, fever, chest pain, difficulty breathing, neck swelling)      See above  10. PREGNANCY: \"Is there any chance you are pregnant?\" \"When was your last menstrual period?\"        N/a    Patient seen in ED yesterday.  Trigger point injections x3 given as well as toradol and norflex.  Pain was at a manageable level when left ED.  He has been using flexeril q6hrs and advil at home; helps for 1/2 hr and then has " to wait til can have next doses.  Heating pad does give some relief but needing to move it from back to neck to shoulder area.  He reports no history of prior back injury/chest pain.  Is able to walk;  Yesterday couldn't straighten his head up but today he can.  He does have an appointment Friday with CAROLYNN Gan PA-C for ED follow up but wondering if can be seen sooner.  No available appointment today/tomorrow [facility] [place].  I did advise going to urgent care ([facility] or back to ED.  He states will try to manage at home til appointment Friday; will be seen sooner if worsens urgent care or ED.    Protocols used: NECK PAIN OR [id number]

## 2023-05-31 NOTE — DISCHARGE INSTRUCTIONS
I am glad you are feeling better.  Please try the Flexeril prescribed which will help with spasming.  Use ibuprofen and Tylenol as needed for pain.  Apply heat to the affected areas and practice gentle stretching.  If no improvement in a couple days follow-up with your clinic provider.  Return to the emergency department if you develop any worsening symptoms.    Thank you for choosing [facility] Emergency Department. It was a pleasure taking care of you today. If you have any questions, please call [phone].    Jody Arriaza PA-C

## 2023-05-31 NOTE — ED TRIAGE NOTES
Pt presents with concern for neck, shoulder, back pain. Pt woke up Saturday with sore muscles. Unable to get relief.

## 2023-05-31 NOTE — ED PROVIDER NOTES
History     Chief Complaint   Patient presents with     Neck Pain     Shoulder Pain     Back Pain       HPI  Ross Dumas is a 33 year old male who presents to the emergency department complaining of spasming in his right thoracic back, right neck, and chest wall.  Symptoms started about 2 to 3 days ago after he was playing with his kids.  He denies any direct injury to the area but over the last few days his neck and back have gotten progressively more tight.  The chest wall pain started today and he describes it as tight and spasming.  He has not been able to sleep for the last couple days due to the pain.  He has been taking ibuprofen for pain, most recently around 3 PM.  He tried baclofen today for spasm relief as well.  He also went to the urgent care where he waited for several hours and then they performed a strep test and discharged him.  Due to lack of sleep and uncontrolled pain he came here for further evaluation.  Denies any significant shortness of breath.  No reported weakness in extremities.      Allergies:  Allergies   Allergen Reactions     Seasonal Allergies        Problem List:    Patient Active Problem List    Diagnosis Date Noted     Adjustment disorder with anxious mood 05/30/2019     Priority: Medium     Allergic conjunctivitis 05/09/2014     Priority: Medium     Allergic rhinitis due to allergen 05/09/2014     Priority: Medium     Seasonal allergies 05/09/2014     Priority: Medium     Biceps tendonitis on left 08/19/2013     Priority: Medium        Past Medical History:    No past medical history on file.    Past Surgical History:    Past Surgical History:   Procedure Laterality Date      KNEE SCOPE,AID ANT CRUCIATE REPAIR  11/23/2004    Ruptured ACL graft, hx of allograft ACL reconstruction with small 5 mm longitudinal tear, mid posterior aspect of lateral meniscus.       Family History:    No family history on file.    Social History:  Marital Status:   [2]  Social History      Tobacco Use     Smoking status: Never     Smokeless tobacco: Never   Substance Use Topics     Alcohol use: No     Drug use: No        Medications:    cyclobenzaprine (FLEXERIL) 10 MG tablet  cyclobenzaprine (FLEXERIL) 5 MG tablet          Review of Systems   All other systems reviewed and are negative.         Physical Exam   BP: (!) 118/92  Pulse: 87  Temp: 98  F (36.7  C)  Resp: 18  Weight: 83.9 kg (185 lb)  SpO2: 100 %      Physical Exam  Vitals and nursing note reviewed.   Constitutional:       Appearance: Normal appearance. He is not ill-appearing, toxic-appearing or diaphoretic.      Comments: Appears uncomfortable.   HENT:      Head: Normocephalic and atraumatic.      Nose: Nose normal.   Eyes:      Extraocular Movements: Extraocular movements intact.      Conjunctiva/sclera: Conjunctivae normal.   Neck:      Comments: Mild torticollis to the left.  Significant spasming and tension in right trapezius musculature and right thoracic spine musculature.  Cardiovascular:      Rate and Rhythm: Normal rate and regular rhythm.      Heart sounds: Normal heart sounds.   Pulmonary:      Effort: Pulmonary effort is normal. No respiratory distress.      Breath sounds: Normal breath sounds.      Comments: Chest wall tenderness noted to bilateral anterior upper aspects with associated spasming in the intercostal spaces  Abdominal:      General: Abdomen is flat.   Musculoskeletal:         General: No deformity.      Cervical back: Neck supple.      Comments: No midline bony tenderness to the cervical, thoracic, or lumbar spine.  No strength deficits noted in upper extremities.   Skin:     General: Skin is warm and dry.   Neurological:      General: No focal deficit present.      Mental Status: He is alert and oriented to person, place, and time.   Psychiatric:         Mood and Affect: Affect normal. Mood is anxious.           ED Course         Procedures      No results found for this or any previous visit (from the past  24 hour(s)).    Medications   ketorolac (TORADOL) injection 30 mg (30 mg Intramuscular $Given 5/30/23 2127)   orphenadrine (NORFLEX) injection 60 mg (60 mg Intramuscular $Given 5/30/23 2124)          Assessments & Plan (with Medical Decision Making)  Ross Dumas is a 33 year old male who presented to the ED complaining of right-sided thoracic back, right neck, and chest wall pain.  Symptoms started slowly over the last few days and progressively worsened.  No reported shortness of breath or fevers.  No weakness or numbness in extremities.  He was afebrile on arrival.  Had evidence of mild leftward torticollis and guarding due to pain.  Exam revealed significantly tense spasming musculature in the lower right trapezius and right thoracic paraspinous musculature.  Also some in the intercostal musculature of the anterior upper chest wall.  Findings consistent with muscle spasming.  Discussed management options with the patient and he was agreeable to trying trigger point injections, risks and benefits discussed and he verbally consented.  Using lidocaine 1% without epinephrine, I localized 3 areas of trigger point spasming, 1 in the right trapezius and 2 in the right thoracic paraspinous musculature.  These areas were cleansed with alcohol wipe then 1 cc lidocaine injected after aspiration performed.  Patient tolerated well and did some relief and was able to lay back in the bed and felt more comfortable.  At that point he still had just diffuse tension and discomfort so we went ahead and tried IM Toradol and Norflex.  This brought his symptoms down to a much more manageable level to the point where he was able to rest and felt comfortable with discharge home.  Patient will be prescribed Flexeril for additional muscle spasm relief at home and I encouraged continued use of ibuprofen or Tylenol.  I advised heat to the areas and gentle stretching.  If no improvement in a couple days he was advised to follow-up with his  clinic provider.  Return precautions were also provided.  All questions answered and patient discharged home in suitable condition.     I have reviewed the nursing notes.    I have reviewed the findings, diagnosis, plan and need for follow up with the patient.      Discharge Medication List as of 5/30/2023 10:23 PM      START taking these medications    Details   !! cyclobenzaprine (FLEXERIL) 10 MG tablet Take 1 tablet (10 mg) by mouth 3 times daily as needed for muscle spasms, Disp-15 tablet, R-0, [facility]       !! - Potential duplicate medications found. Please discuss with provider.          Final diagnoses:   Neck muscle spasm   Spasm of thoracic back muscle   Chest wall pain     Note: Chart documentation done in part with Dragon Voice Recognition software. Although reviewed after completion, some word and grammatical errors may remain.     5/30/2023   [facility] EMERGENCY DEPT     Jody Arriaza PA-C  05/30/23 0087

## 2023-06-02 ENCOUNTER — OFFICE VISIT (OUTPATIENT)
Dept: FAMILY MEDICINE | Facility: OTHER | Age: 33
End: 2023-06-02
Payer: COMMERCIAL

## 2023-06-02 VITALS
BODY MASS INDEX: 27.81 KG/M2 | HEART RATE: 100 BPM | RESPIRATION RATE: 18 BRPM | WEIGHT: 183.5 LBS | HEIGHT: 68 IN | TEMPERATURE: 98.6 F | DIASTOLIC BLOOD PRESSURE: 78 MMHG | OXYGEN SATURATION: 99 % | SYSTOLIC BLOOD PRESSURE: 120 MMHG

## 2023-06-02 DIAGNOSIS — M94.0 COSTOCHONDRITIS: ICD-10-CM

## 2023-06-02 DIAGNOSIS — R20.2 TINGLING OF RIGHT UPPER EXTREMITY: ICD-10-CM

## 2023-06-02 DIAGNOSIS — M43.6 TORTICOLLIS: Primary | ICD-10-CM

## 2023-06-02 DIAGNOSIS — M62.838 MUSCLE SPASM: ICD-10-CM

## 2023-06-02 LAB
ALBUMIN SERPL BCG-MCNC: 4.4 G/DL (ref 3.5–5.2)
ALP SERPL-CCNC: 66 U/L (ref 40–129)
ALT SERPL W P-5'-P-CCNC: 21 U/L (ref 10–50)
ANION GAP SERPL CALCULATED.3IONS-SCNC: 11 MMOL/L (ref 7–15)
AST SERPL W P-5'-P-CCNC: 28 U/L (ref 10–50)
BASOPHILS # BLD AUTO: 0 10E3/UL (ref 0–0.2)
BASOPHILS NFR BLD AUTO: 0 %
BILIRUB SERPL-MCNC: 0.5 MG/DL
BUN SERPL-MCNC: 13.7 MG/DL (ref 6–20)
CALCIUM SERPL-MCNC: 9.5 MG/DL (ref 8.6–10)
CHLORIDE SERPL-SCNC: 105 MMOL/L (ref 98–107)
CREAT SERPL-MCNC: 0.96 MG/DL (ref 0.67–1.17)
DEPRECATED HCO3 PLAS-SCNC: 25 MMOL/L (ref 22–29)
EOSINOPHIL # BLD AUTO: 0.2 10E3/UL (ref 0–0.7)
EOSINOPHIL NFR BLD AUTO: 2 %
ERYTHROCYTE [DISTWIDTH] IN BLOOD BY AUTOMATED COUNT: 12.6 % (ref 10–15)
FERRITIN SERPL-MCNC: 345 NG/ML (ref 31–409)
GFR SERPL CREATININE-BSD FRML MDRD: >90 ML/MIN/1.73M2
GLUCOSE SERPL-MCNC: 92 MG/DL (ref 70–99)
HCT VFR BLD AUTO: 41.9 % (ref 40–53)
HGB BLD-MCNC: 14.3 G/DL (ref 13.3–17.7)
IMM GRANULOCYTES # BLD: 0 10E3/UL
IMM GRANULOCYTES NFR BLD: 0 %
LYMPHOCYTES # BLD AUTO: 2.1 10E3/UL (ref 0.8–5.3)
LYMPHOCYTES NFR BLD AUTO: 30 %
MAGNESIUM SERPL-MCNC: 2 MG/DL (ref 1.7–2.3)
MCH RBC QN AUTO: 29.7 PG (ref 26.5–33)
MCHC RBC AUTO-ENTMCNC: 34.1 G/DL (ref 31.5–36.5)
MCV RBC AUTO: 87 FL (ref 78–100)
MONOCYTES # BLD AUTO: 0.7 10E3/UL (ref 0–1.3)
MONOCYTES NFR BLD AUTO: 10 %
NEUTROPHILS # BLD AUTO: 4 10E3/UL (ref 1.6–8.3)
NEUTROPHILS NFR BLD AUTO: 57 %
PLATELET # BLD AUTO: 199 10E3/UL (ref 150–450)
POTASSIUM SERPL-SCNC: 4 MMOL/L (ref 3.4–5.3)
PROT SERPL-MCNC: 7.7 G/DL (ref 6.4–8.3)
RBC # BLD AUTO: 4.81 10E6/UL (ref 4.4–5.9)
SODIUM SERPL-SCNC: 141 MMOL/L (ref 136–145)
WBC # BLD AUTO: 7 10E3/UL (ref 4–11)

## 2023-06-02 PROCEDURE — 85025 COMPLETE CBC W/AUTO DIFF WBC: CPT | Performed by: PHYSICIAN ASSISTANT

## 2023-06-02 PROCEDURE — 99214 OFFICE O/P EST MOD 30 MIN: CPT | Performed by: PHYSICIAN ASSISTANT

## 2023-06-02 PROCEDURE — 83735 ASSAY OF MAGNESIUM: CPT | Performed by: PHYSICIAN ASSISTANT

## 2023-06-02 PROCEDURE — 82728 ASSAY OF FERRITIN: CPT | Performed by: PHYSICIAN ASSISTANT

## 2023-06-02 PROCEDURE — 80053 COMPREHEN METABOLIC PANEL: CPT | Performed by: PHYSICIAN ASSISTANT

## 2023-06-02 PROCEDURE — 36415 COLL VENOUS BLD VENIPUNCTURE: CPT | Performed by: PHYSICIAN ASSISTANT

## 2023-06-02 RX ORDER — BACLOFEN 10 MG/1
TABLET ORAL
COMMUNITY
Start: 2023-05-30 | End: 2023-06-13

## 2023-06-02 RX ORDER — GABAPENTIN 300 MG/1
CAPSULE ORAL
Qty: 81 CAPSULE | Refills: 0 | Status: SHIPPED | OUTPATIENT
Start: 2023-06-02 | End: 2024-03-08

## 2023-06-02 RX ORDER — PREDNISONE 20 MG/1
TABLET ORAL
Qty: 20 TABLET | Refills: 0 | Status: SHIPPED | OUTPATIENT
Start: 2023-06-02 | End: 2023-06-13

## 2023-06-02 ASSESSMENT — PAIN SCALES - GENERAL: PAINLEVEL: SEVERE PAIN (6)

## 2023-06-02 NOTE — PROGRESS NOTES
Assessment & Plan     1. Torticollis    2. Muscle spasm    3. Costochondritis    4. Tingling of right upper extremity      See HPI. Discussed concern for worsening muscle spasms and pains from compensatory positioning/movements. In addition, patient appears to have costochondral pains which are worse with deep breathing and use of shoulders. He had two different muscle relaxants which he has used as needed. However, he has not utilized any other supportive therapies. Discussed the importance of use of heat either in bathtub or through heat pads. In addition, I provided him with basic stretches to begin to practice as tolerated. I would like him to begin working with our physical therapist and suggested he look into local chiropractors or massage therapists as this would also be beneficial. Will start him on prednisone taper for costochondral pains. He may continue to use the muscle relaxants as needed. I will also provide him with course of gabapentin for pain reduction as he begins to work with PT and practice stretches at home.     I opted to check basic labs to rule out more serious causes for his pains. Fortunately, these have returned within normal range. If symptoms do not improve as expected he will reach out to update me.     Reference material attached to the AVS. Education on possible adverse effects of medications reviewed.     - Comprehensive metabolic panel (BMP + Alb, Alk Phos, ALT, AST, Total. Bili, TP); Future  - Magnesium; Future  - Ferritin; Future  - CBC with platelets and differential; Future  - gabapentin (NEURONTIN) 300 MG capsule; Take 1 cap (300mg) at bed [id number], then may increase to 2 caps (600mg) at bed [id number], then may increase to 1 cap (300mg) in the AM and 2 caps (600mg) at bed (Can cause drowsiness)  - Physical Therapy Referral; Future  - Comprehensive metabolic panel (BMP + Alb, Alk Phos, ALT, AST, Total. Bili, TP)  - Magnesium  - Ferritin  - CBC with platelets and  differential  - predniSONE (DELTASONE) 20 MG tablet; Take 3 tabs by mouth daily x 3 days, then 2 tabs daily x 3 days, then 1 tab daily x 3 days, then 1/2 tab daily x 3 days. (Take with food)  - Physical Therapy Referral; Future    ASHLYN Parikh [facility]    Phil Hawkins is a 33 year old, presenting for the following health issues:  Pain        6/2/2023     8:42 AM   Additional Questions   Roomed by Samreen LEONARD   Accompanied by self         6/2/2023     8:42 AM   Patient Reported Additional Medications   Patient reports taking the following new medications muscler relaxer (Flexeril from ER) and ER gave Baclofen, Tylenol and Advil     Pain    History of Present Illness       Reason for visit:  Spasam    He eats 2-3 servings of fruits and vegetables daily.He consumes 0 sweetened beverage(s) daily.He exercises with enough effort to increase his heart rate 30 to 60 minutes per day.  He exercises with enough effort to increase his heart rate 5 days per week.   He is taking medications regularly.     Patient is a 33 year old male who presents today for follow up on pains in his shoulders, neck and chest. He informs me that his symptoms have been occurring for about a week. His pains began following a bout of loose stools, nausea and vomiting. These symptoms have since improved. However, following recovering from the symptoms he began to experience pains in the right back and neck. He was seen at a local urgent care in [place] whom, per his report, tested him for strep. This was negative. He was started on conservative cares. Unfortunately, pains persisted despite initial treatments. Patient was seen at [facility] ED on 05/30. Review of the documentation noted that he had superficial tenderness to palpation over the chest wall and intercostal spaces. Trigger point injections administered which provided immediate relief, but quickly wore off per his report. Today pains are not well  "controlled. Feels that both shoulders, sides of neck, upper back and chest are painful.    Denies alarm symptoms concerning for angina, arrhythmia, renal stones, pancreatitis, gallstones or fever.    Rooming information:  Last Thursday through Saturday he was having diarrhea and after that was done he felt really sore. He started having pain in his upper back, neck, bilateral shoulders, and chest. He did go to Urgent Care in [place] near [facility] and they gave him a strep test for some reason which was negative and gave him some muscle relaxers to try and that was helpful just a little bit. He went to ER in [place] on Tuesday night and was given Lidocaine injections into his back which were somewhat helpful to calm his muscles, Toradol shot, and something else for pain but that has worn off and his pain went back to how it was. He was also given another muscle relaxer Baclofen from the ER which was not as helpful as the Flexeril. Otherwise he has been using Tylenol and Advil OTC. His wife does have to help him change his shirt, he did call the ER back and they said to give this more time, they did not complete any imaging at either facility.       Review of Systems   Constitutional, HEENT, cardiovascular, pulmonary, gi and gu systems are negative, except as otherwise noted.      Objective    /78   Pulse 100   Temp 98.6  F (37  C) (Temporal)   Resp 18   Ht 1.727 m (5' 8\")   Wt 83.2 kg (183 lb 8 oz)   SpO2 99%   BMI 27.90 kg/m    Body mass index is 27.9 kg/m .  Physical Exam   GENERAL: healthy, alert and no distress  RESP: lungs clear to auscultation - no rales, rhonchi or wheezes  CV: regular rate and rhythm, normal S1 S2, no S3 or S4, no murmur, click or rub, no peripheral edema and peripheral pulses strong  MS: Pain limits AROM abduction, flexion and internal rotation in shoulders bilaterally. Mod/Sev tenderness to palpation over the trapezius and rhomboids bilaterally. Both muscle groups " tense and spasmed.     Tenderness to palpation along the sternocostal joints bilaterally. Tenderness to palpation along the anterior and lateral chest walls.     Pain limits AROM rotation and lateral bend of neck. Tenderness to palpation along the lateral neck bilaterally.   PSYCH: mentation appears normal, affect normal/bright    Results for orders placed or performed in visit on 06/02/23   Comprehensive metabolic panel (BMP + Alb, Alk Phos, ALT, AST, Total. Bili, TP)     Status: Normal   Result Value Ref Range    Sodium 141 136 - 145 mmol/L    Potassium 4.0 3.4 - 5.3 mmol/L    Chloride 105 98 - 107 mmol/L    Carbon Dioxide (CO2) 25 22 - 29 mmol/L    Anion Gap 11 7 - 15 mmol/L    Urea Nitrogen 13.7 6.0 - 20.0 mg/dL    Creatinine 0.96 0.67 - 1.17 mg/dL    Calcium 9.5 8.6 - 10.0 mg/dL    Glucose 92 70 - 99 mg/dL    Alkaline Phosphatase 66 40 - 129 U/L    AST 28 10 - 50 U/L    ALT 21 10 - 50 U/L    Protein Total 7.7 6.4 - 8.3 g/dL    Albumin 4.4 3.5 - 5.2 g/dL    Bilirubin Total 0.5 <=1.2 mg/dL    GFR Estimate >90 >60 mL/min/1.73m2   Magnesium     Status: Normal   Result Value Ref Range    Magnesium 2.0 1.7 - 2.3 mg/dL   Ferritin     Status: Normal   Result Value Ref Range    Ferritin 345 31 - 409 ng/mL   CBC with platelets and differential     Status: None   Result Value Ref Range    WBC Count 7.0 4.0 - 11.0 10e3/uL    RBC Count 4.81 4.40 - 5.90 10e6/uL    Hemoglobin 14.3 13.3 - 17.7 g/dL    Hematocrit 41.9 40.0 - 53.0 %    MCV 87 78 - 100 fL    MCH 29.7 26.5 - 33.0 pg    MCHC 34.1 31.5 - 36.5 g/dL    RDW 12.6 10.0 - 15.0 %    Platelet Count 199 150 - 450 10e3/uL    % Neutrophils 57 %    % Lymphocytes 30 %    % Monocytes 10 %    % Eosinophils 2 %    % Basophils 0 %    % Immature Granulocytes 0 %    Absolute Neutrophils 4.0 1.6 - 8.3 10e3/uL    Absolute Lymphocytes 2.1 0.8 - 5.3 10e3/uL    Absolute Monocytes 0.7 0.0 - 1.3 10e3/uL    Absolute Eosinophils 0.2 0.0 - 0.7 10e3/uL    Absolute Basophils 0.0 0.0 - 0.2 10e3/uL     Absolute Immature Granulocytes 0.0 <=0.4 10e3/uL   CBC with platelets and differential     Status: None    Narrative    The following orders were created for panel order CBC with platelets and differential.  Procedure                               Abnormality         Status                     ---------                               -----------         ------                     CBC with platelets and d...[[id number]]                      Final result                 Please view results for these tests on the individual orders.

## 2023-06-12 ENCOUNTER — MYC MEDICAL ADVICE (OUTPATIENT)
Dept: FAMILY MEDICINE | Facility: OTHER | Age: 33
End: 2023-06-12
Payer: COMMERCIAL

## 2023-06-12 DIAGNOSIS — M62.830 BACK MUSCLE SPASM: Primary | ICD-10-CM

## 2023-06-12 DIAGNOSIS — M43.6 TORTICOLLIS: ICD-10-CM

## 2023-06-12 NOTE — TELEPHONE ENCOUNTER
Patient calling back requesting Lupillo call the patient.   RN scheduled a visit for the patient.     Next 5 appointments (look out 90 days)    Jun 13, 2023  2:20 PM  (Arrive by 2:00 PM)  Provider Visit with Lupillo Gan PA-C  [facility] ([facility] ) [address] [place] [id number]  [phone]        LUBNA MckeonN, RN, PHN  [place]  June 12, 2023

## 2023-06-12 NOTE — TELEPHONE ENCOUNTER
Update to Lupillo Gan.   Patient was seen on 06/02/23.     SITUATION:   Increasing pain and discomfort.   Review [employer] message.     BACKGROUND:   The patient has completed the Prednisone on Saturday.   When the patient was taking the higher doses of Prednisone he felt fine. When he was taking one tablet he started to feel sore the first day, then the second day the pain was more intense.     Currently the patient states his back, shoulders, neck, and chest are stiff. There is a lot of tension and tenderness.   Pain is rated at a 4/10 (after heat), achy pain, and is constant.   When taking a deep breath in the muscles in his chest are tender.   No other symptoms.     HOME TREATMENTS:  Patient is taking Gabapentin, stretching, and soaks.   Heating pad.     Patient was waiting for a call by physical therapy. RN provided number and patient will call to schedule.     PLAN:       Routed to provider    PANDA Mckeon, RN, PHN  [place]  June 12, 2023

## 2023-06-13 ENCOUNTER — OFFICE VISIT (OUTPATIENT)
Dept: FAMILY MEDICINE | Facility: OTHER | Age: 33
End: 2023-06-13
Payer: COMMERCIAL

## 2023-06-13 VITALS
DIASTOLIC BLOOD PRESSURE: 84 MMHG | HEART RATE: 101 BPM | TEMPERATURE: 98 F | BODY MASS INDEX: 28.19 KG/M2 | OXYGEN SATURATION: 97 % | SYSTOLIC BLOOD PRESSURE: 110 MMHG | WEIGHT: 186 LBS | HEIGHT: 68 IN | RESPIRATION RATE: 16 BRPM

## 2023-06-13 DIAGNOSIS — M43.6 TORTICOLLIS: Primary | ICD-10-CM

## 2023-06-13 DIAGNOSIS — M43.6 TORTICOLLIS: ICD-10-CM

## 2023-06-13 DIAGNOSIS — M94.0 COSTOCHONDRITIS: ICD-10-CM

## 2023-06-13 DIAGNOSIS — M62.838 MUSCLE SPASM: ICD-10-CM

## 2023-06-13 DIAGNOSIS — M62.830 BACK MUSCLE SPASM: ICD-10-CM

## 2023-06-13 DIAGNOSIS — M62.838 MUSCLE SPASM: Primary | ICD-10-CM

## 2023-06-13 PROCEDURE — 99214 OFFICE O/P EST MOD 30 MIN: CPT | Performed by: PHYSICIAN ASSISTANT

## 2023-06-13 RX ORDER — CLONAZEPAM 0.5 MG/1
0.5 TABLET ORAL 2 TIMES DAILY PRN
Qty: 30 TABLET | Refills: 0 | Status: SHIPPED | OUTPATIENT
Start: 2023-06-13 | End: 2024-03-08

## 2023-06-13 ASSESSMENT — PAIN SCALES - GENERAL: PAINLEVEL: SEVERE PAIN (6)

## 2023-06-13 NOTE — PROGRESS NOTES
Assessment & Plan     1. Muscle spasm    2. Costochondritis    3. Torticollis    4. Back muscle spasm      Patient presents today for follow up on neck and back muscle spasm. He was seen at the [facility] ED on 05/30/23 for the spasms and followed up in the clinic on 06/02/23. He was treated with oral steroid which provided only temporary relief. He has used robaxin without benefit. Currently taking gabapentin, but is not sure that this is providing much benefit either. Of note, the patient did have relief with trigger point injections and IM toradol and norflex. Today he is hunched over in order to reduce discomfort from the back and neck spasm. I discussed with the patient that I will have him use klonopin for the muscle tension, but that this therapy is not intended for long term use. I will also switch the patient to tizanidine as he has had limited benefit from other muscle relaxants. He is scheduled for PT in the near future. I emphasized the importance of this treatment as the medications are designed to reduce the symptoms, but the therapy will help to resolve the spasms. Orthopedic referral to see if the patient would benefit from repeat trigger point and/or IM injections.     Cautioned the patient about the sedative effects of the medications he has been prescribed. He was instructed not to operate machinery while taking. He was also cautioned about the addictive effects of the klonopin.     - clonazePAM (KLONOPIN) 0.5 MG tablet  Dispense: 30 tablet; Refill: 0  - tiZANidine (ZANAFLEX) 4 MG tablet  Dispense: 60 tablet; Refill: 0      [facility] is a 33 year old, presenting for the following health issues:  Musculoskeletal Problem (pain)        6/13/2023     2:01 PM   Additional Questions   Roomed by charlie   Accompanied by alone         6/13/2023     2:01 PM   Patient Reported Additional Medications   Patient reports taking the following  "new medications no     Musculoskeletal Problem  This is a recurrent problem. The current episode started 1 to 4 weeks ago. The problem occurs constantly. The problem has been unchanged. Associated symptoms comments: Shoulders, chest, and back. The symptoms are aggravated by walking, exertion and bending. He has tried nothing for the symptoms.   History of Present Illness       Reason for visit:  Spasam    He eats 2-3 servings of fruits and vegetables daily.He consumes 0 sweetened beverage(s) daily.He exercises with enough effort to increase his heart rate 30 to 60 minutes per day.  He exercises with enough effort to increase his heart rate 5 days per week.   He is taking medications regularly.       Review of Systems   Constitutional, HEENT, cardiovascular, pulmonary, gi and gu systems are negative, except as otherwise noted.      Objective    /84   Pulse 101   Temp 98  F (36.7  C) (Temporal)   Resp 16   Ht 1.727 m (5' 8\")   Wt 84.4 kg (186 lb)   SpO2 97%   BMI 28.28 kg/m    Body mass index is 28.28 kg/m .  Physical Exam   Patient hunched forward due to spasm in back and neck. Tenderness to palpation along trapezius R>L and lateral neck R>L                "

## 2023-06-14 ENCOUNTER — MYC MEDICAL ADVICE (OUTPATIENT)
Dept: NEUROSURGERY | Facility: CLINIC | Age: 33
End: 2023-06-14
Payer: COMMERCIAL

## 2023-06-14 ENCOUNTER — PRE VISIT (OUTPATIENT)
Dept: NEUROSURGERY | Facility: CLINIC | Age: 33
End: 2023-06-14
Payer: COMMERCIAL

## 2023-06-15 ENCOUNTER — OFFICE VISIT (OUTPATIENT)
Dept: PALLIATIVE MEDICINE | Facility: CLINIC | Age: 33
End: 2023-06-15
Payer: COMMERCIAL

## 2023-06-15 ENCOUNTER — TELEPHONE (OUTPATIENT)
Dept: FAMILY MEDICINE | Facility: OTHER | Age: 33
End: 2023-06-15

## 2023-06-15 ENCOUNTER — PRE VISIT (OUTPATIENT)
Dept: PALLIATIVE MEDICINE | Facility: CLINIC | Age: 33
End: 2023-06-15

## 2023-06-15 VITALS
WEIGHT: 186 LBS | SYSTOLIC BLOOD PRESSURE: 105 MMHG | DIASTOLIC BLOOD PRESSURE: 71 MMHG | HEART RATE: 71 BPM | BODY MASS INDEX: 28.28 KG/M2

## 2023-06-15 DIAGNOSIS — M43.6 TORTICOLLIS: ICD-10-CM

## 2023-06-15 DIAGNOSIS — M62.838 MUSCLE SPASM: ICD-10-CM

## 2023-06-15 DIAGNOSIS — M94.0 COSTOCHONDRITIS: ICD-10-CM

## 2023-06-15 PROCEDURE — 99203 OFFICE O/P NEW LOW 30 MIN: CPT | Mod: 25

## 2023-06-15 PROCEDURE — 99207 PR NO CHARGE INJECTABLE MED/DRUG: CPT

## 2023-06-15 PROCEDURE — 20552 NJX 1/MLT TRIGGER POINT 1/2: CPT

## 2023-06-15 RX ORDER — KETOROLAC TROMETHAMINE 30 MG/ML
30 INJECTION, SOLUTION INTRAMUSCULAR; INTRAVENOUS ONCE
Status: COMPLETED | OUTPATIENT
Start: 2023-06-15 | End: 2023-06-15

## 2023-06-15 RX ORDER — IBUPROFEN 200 MG
200 TABLET ORAL EVERY 4 HOURS PRN
COMMUNITY
End: 2024-04-30

## 2023-06-15 RX ORDER — ACETAMINOPHEN 500 MG
500-1000 TABLET ORAL EVERY 6 HOURS PRN
COMMUNITY
End: 2024-04-30

## 2023-06-15 RX ORDER — BUPIVACAINE HYDROCHLORIDE 5 MG/ML
10 INJECTION, SOLUTION PERINEURAL ONCE
Status: COMPLETED | OUTPATIENT
Start: 2023-06-15 | End: 2023-06-15

## 2023-06-15 RX ADMIN — BUPIVACAINE HYDROCHLORIDE 50 MG: 5 INJECTION, SOLUTION PERINEURAL at 13:07

## 2023-06-15 RX ADMIN — KETOROLAC TROMETHAMINE 30 MG: 30 INJECTION, SOLUTION INTRAMUSCULAR; INTRAVENOUS at 13:06

## 2023-06-15 ASSESSMENT — PAIN SCALES - GENERAL: PAINLEVEL: SEVERE PAIN (6)

## 2023-06-15 NOTE — TELEPHONE ENCOUNTER
RECORDS RECEIVED FROM: Internal   REASON FOR VISIT: spasmed/tense muscles in the neck, upper back and chest. No improvement with muscle relaxants, anti-inflammatories and gabapentin. Starting PT this week   Date of Appt: 06/15/2023   NOTES (FOR ALL VISITS) STATUS DETAILS   OFFICE NOTE from referring provider Internal 06/13/2022 Dr Gan [facility]    OFFICE NOTE from other specialist Care Everywhere 05/30/2023 [facility] urgent care   DISCHARGE SUMMARY from hospital N/A    DISCHARGE REPORT from the ER Internal 05/30/2023 [facility] ED    OPERATIVE REPORT N/A    [facility] (MS ONLY) N/A    EMG N/A    EEG N/A    MEDICATION LIST N/A    IMAGING  (FOR ALL VISITS)     LUMBAR PUNCTURE N/A    DANIELLE SCAN (MOVEMENT) N/A    ULTRASOUND (CAROTID BILAT) *VASCULAR* N/A    MRI (HEAD, NECK, SPINE) N/A    CT (HEAD, NECK, SPINE) N/A

## 2023-06-15 NOTE — TELEPHONE ENCOUNTER
Orthopedic referral was placed. Patient has appointment with spine physician today.   Patient is reporting orthopedic referral was incorrect and he needs neuro instead.   Recommended to patient he still see spine provider today as scheduled.     Hannah CALVON, RN  [facility]

## 2023-06-15 NOTE — TELEPHONE ENCOUNTER
Pt calling stating regarding muscle spasms in his back/neck/shoulders. Was given a prescription for this and they did help. Pt has been seen already in the clinic. Please see the clinic notes. Pt stated that he has a referral to neuro, but wants to know what he should do in the mean time?    Please advise, thanks    Radha Haywood RN  [facility]

## 2023-06-15 NOTE — PROGRESS NOTES
Rsos Dumas is a 33 year old male with a past medical history significant for nothing who presents with a chief complaint of muscular pain in the cervical and thoracic spine region.     The pain has been present for 3 weeks .    The pain is Severe Pain (6) in severity.    The pain is described as flexing and spasm.   The pain is alleviated by trigger point injections,heat.    It is exacerbated by missing medications.    Modalities that have been utilized in the past which were helpful include trigger point injection,toradol,prednisone, lidocaine patches .    Things that were not helpful, but tried ,include baclofen, advil.    The patient has never tried menthol patches.      Current Outpatient Medications   Medication     acetaminophen (TYLENOL) 500 MG tablet     clonazePAM (KLONOPIN) 0.5 MG tablet     gabapentin (NEURONTIN) 300 MG capsule     ibuprofen (ADVIL/MOTRIN) 200 MG tablet     tiZANidine (ZANAFLEX) 4 MG tablet     No current facility-administered medications for this visit.     Allergies   Allergen Reactions     Penicillins      Thinks he had a reaction when he was a child but not sure of the reaction (just what his mom told him)     Seasonal Allergies       No past medical history on file.  Past Surgical History:   Procedure Laterality Date     HC KNEE SCOPE,AID ANT CRUCIATE REPAIR  11/23/2004    Ruptured ACL graft, hx of allograft ACL reconstruction with small 5 mm longitudinal tear, mid posterior aspect of lateral meniscus.     No family history on file.  Social History     Socioeconomic History     Marital status:    Tobacco Use     Smoking status: Never     Smokeless tobacco: Never   Vaping Use     Vaping status: Never Used   Substance and Sexual Activity     Alcohol use: No     Drug use: No      ROS: 10 point ROS neg other than the symptoms noted above in the HPI.  Physical Exam  Vitals and nursing note reviewed. Exam conducted with a chaperone present.   Neck:      Vascular: No carotid  bruit.   Musculoskeletal:         General: Tenderness present. No swelling, deformity or signs of injury.      Cervical back: Tenderness present. No edema, erythema, signs of trauma, rigidity, torticollis or crepitus. Pain with movement and muscular tenderness present. No spinous process tenderness. Decreased range of motion.      Right lower leg: No edema.      Left lower leg: No edema.   Lymphadenopathy:      Cervical: No cervical adenopathy.         A/P:Patient is a 32 y/o man with cervicalgia that seem to have a myofascial cause.  He has several trigger points in the cervical region.  I recommend:  1.  Trigger point injections today  2.  Diclofenac 50 mg bid prn  3.  Arrange physical therapy  4.  F/U in 6-8 weeks        Trigger Point Injection    The patient's identity, the procedure to be performed and the specific site of the procedure was verified in accordance with The [facility] Protocol.    Procedure Note: Informed consent was obtained.  The patient was positioned comfortably. The patient was then prepped in a sterile fashion with alcohol.  There was no evidence of infection at the site of needle insertions.  A 27-gauge, 1-1/2 inch  needle was placed into the painful muscle.  The patient tolerated the procedure well and was discharged from the clinic 30 minutes later.    Medication:  10ml of 0.25% Bupivacaine     Sites infiltrated:  Bilateral paraspinous musculature  Counseling: Greater than 50% of this patient visit was spent in counseling the patient regarding the treatment of their pain, coordinating their overall treatment plan and assessing their progress.

## 2023-06-15 NOTE — TELEPHONE ENCOUNTER
He can continue to apply heat/ice, stretch as tolerated and meet with the physical therapist as scheduled.    Lupillo Gan PA-C on 6/15/2023 at 12:37 PM

## 2023-06-15 NOTE — PATIENT INSTRUCTIONS
Medication Changes:    Start taking diclofenac 50mg. Stop taking ibuprofen. Continue taking tizanidine. Continue taking the Klonopin, but take it very sparingly. Over the counter you can get capsaicin cream (or patches) to use.    For refills of opioid medications - You MUST call (Or MyChart) the clinic DIRECTLY and at least 7 days before you run out of your medication.    Please provide the clinic with a minium of 1 week notice, on all other prescription refills.     Referrals:    Physical Therapy for TENS unit    Follow up:      Follow up in 1 month, if needed.       To speak with a nurse, schedule/reschedule/cancel a clinic appointment, or request a medication refill call: [phone].    You can also reach us by [employer]: [email or web address]

## 2023-06-15 NOTE — TELEPHONE ENCOUNTER
Re sent message to patient regarding scheduling. All information is in message for patient to contact.    Leyda Arellano MA

## 2023-06-26 ENCOUNTER — MYC MEDICAL ADVICE (OUTPATIENT)
Dept: FAMILY MEDICINE | Facility: OTHER | Age: 33
End: 2023-06-26
Payer: COMMERCIAL

## 2023-06-26 DIAGNOSIS — M43.6 TORTICOLLIS: ICD-10-CM

## 2023-06-26 DIAGNOSIS — M62.838 MUSCLE SPASM: ICD-10-CM

## 2023-06-26 RX ORDER — CLONAZEPAM 0.5 MG/1
0.5 TABLET ORAL 2 TIMES DAILY PRN
Qty: 30 TABLET | Refills: 0 | OUTPATIENT
Start: 2023-06-26

## 2023-06-26 NOTE — TELEPHONE ENCOUNTER
Pending Prescriptions:                       Disp   Refills    clonazePAM (KLONOPIN) 0.5 MG tablet        30 tab*0        Sig: Take 1 tablet (0.5 mg) by mouth 2 times daily as           needed for muscle spasms    Routing refill request to provider for review/approval because:  Drug not on the [facility] [place] [facility] according to the [facility] refill protocol     Requested Prescriptions   Pending Prescriptions Disp Refills    clonazePAM (KLONOPIN) 0.5 MG tablet 30 tablet 0     Sig: Take 1 tablet (0.5 mg) by mouth 2 times daily as needed for muscle spasms       There is no refill protocol information for this order

## 2023-06-26 NOTE — TELEPHONE ENCOUNTER
Patient has been transitioned to alternative therapies including diclofenac and tizanidine.     Lupillo Gan PA-C on 6/26/2023 at 6:50 PM

## 2023-06-29 ENCOUNTER — TRANSFERRED RECORDS (OUTPATIENT)
Dept: HEALTH INFORMATION MANAGEMENT | Facility: CLINIC | Age: 33
End: 2023-06-29
Payer: COMMERCIAL

## 2023-07-08 ENCOUNTER — HEALTH MAINTENANCE LETTER (OUTPATIENT)
Age: 33
End: 2023-07-08

## 2023-07-12 ENCOUNTER — TRANSFERRED RECORDS (OUTPATIENT)
Dept: HEALTH INFORMATION MANAGEMENT | Facility: CLINIC | Age: 33
End: 2023-07-12
Payer: COMMERCIAL

## 2023-08-10 ENCOUNTER — TRANSFERRED RECORDS (OUTPATIENT)
Dept: HEALTH INFORMATION MANAGEMENT | Facility: CLINIC | Age: 33
End: 2023-08-10
Payer: COMMERCIAL

## 2024-03-08 ENCOUNTER — VIRTUAL VISIT (OUTPATIENT)
Dept: FAMILY MEDICINE | Facility: OTHER | Age: 34
End: 2024-03-08
Payer: COMMERCIAL

## 2024-03-08 DIAGNOSIS — G89.29 CHRONIC BILATERAL LOW BACK PAIN WITH BILATERAL SCIATICA: Primary | ICD-10-CM

## 2024-03-08 DIAGNOSIS — M54.42 CHRONIC BILATERAL LOW BACK PAIN WITH BILATERAL SCIATICA: Primary | ICD-10-CM

## 2024-03-08 DIAGNOSIS — M54.41 CHRONIC BILATERAL LOW BACK PAIN WITH BILATERAL SCIATICA: Primary | ICD-10-CM

## 2024-03-08 PROCEDURE — 99214 OFFICE O/P EST MOD 30 MIN: CPT | Mod: 95 | Performed by: PHYSICIAN ASSISTANT

## 2024-03-08 RX ORDER — METHYLPREDNISOLONE 4 MG
TABLET, DOSE PACK ORAL
Qty: 21 TABLET | Refills: 0 | Status: SHIPPED | OUTPATIENT
Start: 2024-03-08 | End: 2024-04-11
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
## 2024-03-08 NOTE — PATIENT INSTRUCTIONS
Will order an MRI of your low back.  Will fill tizanidine and a Medrol dose pack to help with your symptoms.  Will determine a further plan once results are back.

## 2024-03-19 ENCOUNTER — HOSPITAL ENCOUNTER (OUTPATIENT)
Dept: MRI IMAGING | Facility: CLINIC | Age: 34
Discharge: HOME | End: 2024-03-19
Attending: PHYSICIAN ASSISTANT | Admitting: PHYSICIAN ASSISTANT
Payer: COMMERCIAL

## 2024-03-19 DIAGNOSIS — M54.42 CHRONIC BILATERAL LOW BACK PAIN WITH BILATERAL SCIATICA: ICD-10-CM

## 2024-03-19 DIAGNOSIS — M54.41 CHRONIC BILATERAL LOW BACK PAIN WITH BILATERAL SCIATICA: ICD-10-CM

## 2024-03-19 DIAGNOSIS — G89.29 CHRONIC BILATERAL LOW BACK PAIN WITH BILATERAL SCIATICA: ICD-10-CM

## 2024-03-19 PROCEDURE — 72148 MRI LUMBAR SPINE W/O DYE: CPT

## 2024-03-20 ENCOUNTER — MYC MEDICAL ADVICE (OUTPATIENT)
Dept: FAMILY MEDICINE | Facility: OTHER | Age: 34
End: 2024-03-20
Payer: COMMERCIAL

## 2024-03-20 DIAGNOSIS — G89.29 CHRONIC BILATERAL LOW BACK PAIN WITH BILATERAL SCIATICA: Primary | ICD-10-CM

## 2024-03-20 DIAGNOSIS — M54.41 CHRONIC BILATERAL LOW BACK PAIN WITH BILATERAL SCIATICA: Primary | ICD-10-CM

## 2024-03-20 DIAGNOSIS — M54.42 CHRONIC BILATERAL LOW BACK PAIN WITH BILATERAL SCIATICA: Primary | ICD-10-CM

## 2024-03-20 NOTE — TELEPHONE ENCOUNTER
To provider to review his lumbar MRI results and advise; see mychart message.  Thank you.  Jojo MUNOZ RN

## 2024-03-29 ENCOUNTER — THERAPY VISIT (OUTPATIENT)
Dept: PHYSICAL THERAPY | Facility: CLINIC | Age: 34
End: 2024-03-29
Attending: PHYSICIAN ASSISTANT
Payer: COMMERCIAL

## 2024-03-29 DIAGNOSIS — M54.41 CHRONIC BILATERAL LOW BACK PAIN WITH BILATERAL SCIATICA: Primary | ICD-10-CM

## 2024-03-29 DIAGNOSIS — G89.29 CHRONIC BILATERAL LOW BACK PAIN WITH BILATERAL SCIATICA: Primary | ICD-10-CM

## 2024-03-29 DIAGNOSIS — M54.42 CHRONIC BILATERAL LOW BACK PAIN WITH BILATERAL SCIATICA: Primary | ICD-10-CM

## 2024-03-29 PROCEDURE — 97110 THERAPEUTIC EXERCISES: CPT | Mod: GP | Performed by: PHYSICAL THERAPIST

## 2024-03-29 PROCEDURE — 97161 PT EVAL LOW COMPLEX 20 MIN: CPT | Mod: GP | Performed by: PHYSICAL THERAPIST

## 2024-03-29 PROCEDURE — 97112 NEUROMUSCULAR REEDUCATION: CPT | Mod: GP | Performed by: PHYSICAL THERAPIST

## 2024-03-29 NOTE — PROGRESS NOTES
PHYSICAL THERAPY EVALUATION  Type of Visit: Evaluation    See electronic medical record for Abuse and Falls Screening details.    Subjective Pt reports ongoing pain for several years. Pt reports picking up a tractor weight about 1 month ago.  Pt reports pain is in low back, radiates in anterior thigh.  Pt notes occasional symptoms in L anterior thigh.  Pt reports lifting will increase pain and symptoms.  Pt reports lying on his L side will reduce pain.  Pt reports doing inversion table at home reduces pain.  Pt reports he needs to sit back in a chair to reduce pain.  Pt reports walking 20 minutes will increase pain.  Pt reports standing in place will increase symptoms.  Pt would like to return to weight lifting and treadmill running.          Presenting condition or subjective complaint: disk  Date of onset: 02/15/24    Relevant medical history: Arthritis; Neck injury   Dates & types of surgery: 2013 ACL, 2014 ACL, 2016 ACL    Prior diagnostic imaging/testing results: MRI     Prior therapy history for the same diagnosis, illness or injury: No          Living Environment  Social support: With a significant other or spouse   Type of home: House   Stairs to enter the home: Yes 12 Is there a railing: Yes   Ramp: No   Stairs inside the home: Yes 12 Is there a railing: Yes   Help at home: None  Equipment owned:       Employment: Yes   Hobbies/Interests:      Patient goals for therapy: lift    Pain assessment: Pain present     Objective   LUMBAR SPINE EVALUATION  PAIN: Pain Level at Rest: 1/10  Pain Level with Use: 6/10  Pain Location: lumbar spine  Pain Quality: Aching, Numb, and Sharp  Pain Frequency: intermittent  Pain is Worst: daytime  Pain is Exacerbated By: Lifting, prolonged standing.   Pain is Relieved By: heat, otc medications, and rest  Pain Progression: Improved  INTEGUMENTARY (edema, incisions): WNL  POSTURE:  Slumped.    GAIT:   Weightbearing Status: WBAT  Assistive Device(s): None  Gait  Deviations: WNL  BALANCE/PROPRIOCEPTION: WNL  WEIGHTBEARING ALIGNMENT: WNL  NON-WEIGHTBEARING ALIGNMENT: WNL   ROM:  Limited flexion, painful.    PELVIC/SI SCREEN: WNL  STRENGTH: WNL  MYOTOMES: WNL  DTR S: WNL  CORD SIGNS: WNL  DERMATOMES:  Occasional R anterior thigh pain to knee.   NEURAL TENSION: Lumbar WNL  FLEXIBILITY: WNL  LUMBAR/HIP Special Tests: WNL   PELVIS/SI SPECIAL TESTS: WNL  FUNCTIONAL TESTS:  NA  PALPATION:  Tender through Lumbar paraspinals R>L.        Assessment & Plan   CLINICAL IMPRESSIONS  Medical Diagnosis: Chronic B low back pain with B sciatica.    Treatment Diagnosis: Chronic B low back pain with B sciatica.   Impression/Assessment: Patient is a 34 year old male with Low back pain and Radiating symptom complaints.  The following significant findings have been identified: Pain. These impairments interfere with their ability to perform recreational activities as compared to previous level of function.     Clinical Decision Making (Complexity):  Clinical Presentation: Stable/Uncomplicated  Clinical Presentation Rationale: based on medical and personal factors listed in PT evaluation  Clinical Decision Making (Complexity): Low complexity    PLAN OF CARE  Treatment Interventions:  Modalities: Cryotherapy, Hot Pack  Interventions: Gait Training, Manual Therapy, Neuromuscular Re-education, Therapeutic Activity, Therapeutic Exercise    Long Term Goals     PT Goal 1  Goal Identifier: HEP  Goal Description: Pt will be independent with HEP in order to improve lumbar motor control and return to activity.  Target Date: 04/26/24  PT Goal 2  Goal Identifier: Walk/run program  Goal Description: Pt will demonstrated ability to progress walk to run program in order to return to prior level of activity.  Target Date: 05/24/24      Frequency of Treatment: 1x/week  Duration of Treatment: 8 weeks    Recommended Referrals to Other Professionals: None.  Education Assessment:   Learner/Method:  Patient;Demonstration;Pictures/Video;No Barriers to Learning  Education Comments: HEP    Risks and benefits of evaluation/treatment have been explained.   Patient/Family/caregiver agrees with Plan of Care.     Evaluation Time:     PT Eval, Low Complexity Minutes (73572): 15     Signing Clinician: Heber Alvarado PT

## 2024-04-02 ENCOUNTER — MYC MEDICAL ADVICE (OUTPATIENT)
Dept: FAMILY MEDICINE | Facility: OTHER | Age: 34
End: 2024-04-02
Payer: COMMERCIAL

## 2024-04-02 DIAGNOSIS — M54.41 CHRONIC BILATERAL LOW BACK PAIN WITH BILATERAL SCIATICA: Primary | ICD-10-CM

## 2024-04-02 DIAGNOSIS — G89.29 CHRONIC BILATERAL LOW BACK PAIN WITH BILATERAL SCIATICA: Primary | ICD-10-CM

## 2024-04-02 DIAGNOSIS — M54.42 CHRONIC BILATERAL LOW BACK PAIN WITH BILATERAL SCIATICA: Primary | ICD-10-CM

## 2024-04-02 DIAGNOSIS — M54.16 LUMBAR RADICULOPATHY: ICD-10-CM

## 2024-04-04 NOTE — TELEPHONE ENCOUNTER
Can we put in a procedure request for a lumbar epidural steroid injection with Dr. Jung in [place]? It should be a right L5-S1 epidural steroid injection or at the discretion of Dr. Jung. I know this has to be a surgical case request now instead of just the XR epidural steroid order. Thanks.    Rikki Strange PA-C

## 2024-04-08 ENCOUNTER — TELEPHONE (OUTPATIENT)
Dept: FAMILY MEDICINE | Facility: OTHER | Age: 34
End: 2024-04-08
Payer: COMMERCIAL

## 2024-04-09 ENCOUNTER — PREP FOR PROCEDURE (OUTPATIENT)
Dept: PALLIATIVE MEDICINE | Facility: CLINIC | Age: 34
End: 2024-04-09
Payer: COMMERCIAL

## 2024-04-09 DIAGNOSIS — M54.41 CHRONIC BILATERAL LOW BACK PAIN WITH BILATERAL SCIATICA: ICD-10-CM

## 2024-04-09 DIAGNOSIS — M54.42 CHRONIC BILATERAL LOW BACK PAIN WITH BILATERAL SCIATICA: ICD-10-CM

## 2024-04-09 DIAGNOSIS — M54.16 LUMBAR RADICULOPATHY: Primary | ICD-10-CM

## 2024-04-09 DIAGNOSIS — G89.29 CHRONIC BILATERAL LOW BACK PAIN WITH BILATERAL SCIATICA: ICD-10-CM

## 2024-04-09 NOTE — TELEPHONE ENCOUNTER
I placed an XR epidural steroid injection order for Dr Jung for a lumbar injection. In a previous email, we were told to complete a case request for Dr. Jung's orders now. Can we call his office and double check how this order needs to be placed? Patient is trying to get in for an injection ASAP.    Rikki Strange PA-C

## 2024-04-09 NOTE — TELEPHONE ENCOUNTER
Do we just fax the order to Dr. Jung's office @ [facility] or do you need to do something different?

## 2024-04-10 NOTE — TELEPHONE ENCOUNTER
Called [facility] and they are not open currently and have not been all week. Unable to get a person when calling.     Called and left message with [facility] for a call back to see if they know what JM needs to do in order for PT to get injection. Unsure what surgical case request is, but is there a form?

## 2024-04-11 RX ORDER — MELOXICAM 15 MG/1
15 TABLET ORAL DAILY
Qty: 30 TABLET | Refills: 2 | Status: ON HOLD | OUTPATIENT
Start: 2024-04-11 | End: 2024-05-03

## 2024-04-11 RX ORDER — CYCLOBENZAPRINE HCL 5 MG
5 TABLET ORAL 3 TIMES DAILY PRN
Qty: 30 TABLET | Refills: 2 | Status: ON HOLD | OUTPATIENT
Start: 2024-04-11 | End: 2024-05-03

## 2024-04-11 NOTE — TELEPHONE ENCOUNTER
I forwarded an email from [facility] with form for Dr. Jung. This needs to be filled out and emailed back. Not able to fax.

## 2024-04-14 ENCOUNTER — HOSPITAL ENCOUNTER (EMERGENCY)
Facility: CLINIC | Age: 34
Discharge: HOME | End: 2024-04-14
Attending: FAMILY MEDICINE | Admitting: FAMILY MEDICINE
Payer: COMMERCIAL

## 2024-04-14 VITALS
HEIGHT: 68 IN | BODY MASS INDEX: 28.04 KG/M2 | HEART RATE: 87 BPM | SYSTOLIC BLOOD PRESSURE: 106 MMHG | OXYGEN SATURATION: 93 % | RESPIRATION RATE: 18 BRPM | WEIGHT: 185 LBS | TEMPERATURE: 98.2 F | DIASTOLIC BLOOD PRESSURE: 63 MMHG

## 2024-04-14 DIAGNOSIS — G89.29 ACUTE EXACERBATION OF CHRONIC LOW BACK PAIN: ICD-10-CM

## 2024-04-14 DIAGNOSIS — R20.2 PARESTHESIAS: ICD-10-CM

## 2024-04-14 DIAGNOSIS — M54.50 ACUTE EXACERBATION OF CHRONIC LOW BACK PAIN: ICD-10-CM

## 2024-04-14 PROCEDURE — 96375 TX/PRO/DX INJ NEW DRUG ADDON: CPT | Performed by: FAMILY MEDICINE

## 2024-04-14 PROCEDURE — 96376 TX/PRO/DX INJ SAME DRUG ADON: CPT | Performed by: FAMILY MEDICINE

## 2024-04-14 PROCEDURE — 250N000011 HC RX IP 250 OP 636: Performed by: FAMILY MEDICINE

## 2024-04-14 PROCEDURE — 96374 THER/PROPH/DIAG INJ IV PUSH: CPT | Performed by: FAMILY MEDICINE

## 2024-04-14 PROCEDURE — 99285 EMERGENCY DEPT VISIT HI MDM: CPT | Performed by: FAMILY MEDICINE

## 2024-04-14 PROCEDURE — 99284 EMERGENCY DEPT VISIT MOD MDM: CPT | Mod: 25 | Performed by: FAMILY MEDICINE

## 2024-04-14 RX ORDER — HYDROMORPHONE HYDROCHLORIDE 1 MG/ML
0.5 INJECTION, SOLUTION INTRAMUSCULAR; INTRAVENOUS; SUBCUTANEOUS EVERY 30 MIN PRN
Status: DISCONTINUED | OUTPATIENT
Start: 2024-04-14 | End: 2024-04-14 | Stop reason: HOSPADM

## 2024-04-14 RX ORDER — METHYLPREDNISOLONE 4 MG
TABLET, DOSE PACK ORAL
Qty: 21 TABLET | Refills: 0 | Status: SHIPPED | OUTPATIENT
Start: 2024-04-14 | End: 2024-04-30

## 2024-04-14 RX ORDER — OXYCODONE HYDROCHLORIDE 5 MG/1
5 TABLET ORAL EVERY 6 HOURS PRN
Qty: 20 TABLET | Refills: 0 | Status: SHIPPED | OUTPATIENT
Start: 2024-04-14 | End: 2024-04-18

## 2024-04-14 RX ORDER — KETOROLAC TROMETHAMINE 30 MG/ML
30 INJECTION, SOLUTION INTRAMUSCULAR; INTRAVENOUS ONCE
Status: COMPLETED | OUTPATIENT
Start: 2024-04-14 | End: 2024-04-14

## 2024-04-14 RX ORDER — METHOCARBAMOL 750 MG/1
750 TABLET, FILM COATED ORAL 3 TIMES DAILY PRN
Qty: 20 TABLET | Refills: 0 | Status: ON HOLD | OUTPATIENT
Start: 2024-04-14 | End: 2024-05-03

## 2024-04-14 RX ADMIN — HYDROMORPHONE HYDROCHLORIDE 1 MG: 1 INJECTION, SOLUTION INTRAMUSCULAR; INTRAVENOUS; SUBCUTANEOUS at 09:15

## 2024-04-14 RX ADMIN — HYDROMORPHONE HYDROCHLORIDE 0.5 MG: 1 INJECTION, SOLUTION INTRAMUSCULAR; INTRAVENOUS; SUBCUTANEOUS at 10:24

## 2024-04-14 RX ADMIN — KETOROLAC TROMETHAMINE 30 MG: 30 INJECTION, SOLUTION INTRAMUSCULAR at 09:12

## 2024-04-14 ASSESSMENT — ACTIVITIES OF DAILY LIVING (ADL)
ADLS_ACUITY_SCORE: 35

## 2024-04-14 ASSESSMENT — COLUMBIA-SUICIDE SEVERITY RATING SCALE - C-SSRS
6. HAVE YOU EVER DONE ANYTHING, STARTED TO DO ANYTHING, OR PREPARED TO DO ANYTHING TO END YOUR LIFE?: NO
1. IN THE PAST MONTH, HAVE YOU WISHED YOU WERE DEAD OR WISHED YOU COULD GO TO SLEEP AND NOT WAKE UP?: NO
2. HAVE YOU ACTUALLY HAD ANY THOUGHTS OF KILLING YOURSELF IN THE PAST MONTH?: NO

## 2024-04-14 NOTE — DISCHARGE INSTRUCTIONS
1.  I have put in an order for an emergent MRI for you, hopefully this will be done this week.  I have consulted the neurosurgery group, Dr. Laurent, someone from his office will follow-up on those results and if it looks like it has gotten significantly worse, they should be contacting you to see if this requires surgery.  If you have any questions or concerns, please contact your primary care doctor.  Please return to the emergency department if you have any episodes where you lose control of your bowel or bladder.

## 2024-04-14 NOTE — ED PROVIDER NOTES
History     Chief Complaint   Patient presents with    Back Pain     HPI  Ross Dumas is a 34 year old male who presents with worsening back pain and leg pain.  Patient is been dealing with back pain for a long while.  Is really flared up over the last month.  Patient saw his regular doctor who ordered an MRI which did show just some mild disc disease.  The plan was to send the patient to physical therapy and patient is set for steroid injection with the pain specialist here next month.  Patient states last night the pain started to get really bad and this morning he was unable to walk because the pain was so severe.  He took an oxycodone and muscle relaxant couple hours ago with no relief.  Patient's had no bowel or bladder incontinence.  Denies any saddle anesthesia.  He does feel some numbness in his legs.  Denies any weakness though in his legs.  Patient did not do anything yesterday out of the ordinary, no recent trauma.      MRI report from 3/19/24:  Narrative & Impression   EXAM: MR LUMBAR SPINE W/O CONTRAST  LOCATION: [facility]  DATE: 3/19/2024                                                                      IMPRESSION:  1.  At the L5/S1 level, there is a symmetric disc bulge with a superimposed focal central disc protrusion with mild spinal canal narrowing.  2.  No significant posterior disc bulge or spinal canal narrowing at any other level within the lumbar spine.  3.  Multilevel posterolateral disc disease and facet arthropathy with multilevel neural foraminal narrowing at L4/L5 and L5/S1 as described above.     Allergies:  Allergies   Allergen Reactions    Penicillins      Thinks he had a reaction when he was a child but not sure of the reaction (just what his mom told him)    Seasonal Allergies        Problem List:    Patient Active Problem List    Diagnosis Date Noted    Chronic bilateral low back pain with bilateral sciatica 03/29/2024     Priority: Medium     "Adjustment disorder with anxious mood 05/30/2019     Priority: Medium    Allergic conjunctivitis 05/09/2014     Priority: Medium    Allergic rhinitis due to allergen 05/09/2014     Priority: Medium    Seasonal allergies 05/09/2014     Priority: Medium    Biceps tendonitis on left 08/19/2013     Priority: Medium        Past Medical History:    No past medical history on file.    Past Surgical History:    Past Surgical History:   Procedure Laterality Date    HC KNEE SCOPE,AID ANT CRUCIATE REPAIR  11/23/2004    Ruptured ACL graft, hx of allograft ACL reconstruction with small 5 mm longitudinal tear, mid posterior aspect of lateral meniscus.       Family History:    No family history on file.    Social History:  Marital Status:   [2]  Social History     Tobacco Use    Smoking status: Never    Smokeless tobacco: Never   Vaping Use    Vaping status: Never Used   Substance Use Topics    Alcohol use: No    Drug use: No        Medications:    methocarbamol (ROBAXIN) 750 MG tablet  methylPREDNISolone (MEDROL DOSEPAK) 4 MG tablet therapy pack  oxyCODONE (ROXICODONE) 5 MG tablet  acetaminophen (TYLENOL) 500 MG tablet  cyclobenzaprine (FLEXERIL) 5 MG tablet  ibuprofen (ADVIL/MOTRIN) 200 MG tablet  meloxicam (MOBIC) 15 MG tablet          Review of Systems   All other systems reviewed and are negative.      Physical Exam   BP: 106/63  Pulse: 87  Temp: 98.2  F (36.8  C)  Resp: 18  Height: 172.7 cm (5' 8\")  Weight: 83.9 kg (185 lb)  SpO2: 93 %      Physical Exam  Vitals and nursing note reviewed.   Constitutional:       Appearance: Normal appearance. He is ill-appearing (in pain).   Cardiovascular:      Pulses: Normal pulses.   Musculoskeletal:      Lumbar back: Tenderness and bony tenderness present. No swelling, deformity, signs of trauma, lacerations or spasms. Positive right straight leg raise test and positive left straight leg raise test.        Back:    Skin:     General: Skin is warm and dry.   Neurological:      " General: No focal deficit present.      Mental Status: He is alert.      Sensory: No sensory deficit.      Motor: No weakness.         ED Course        Procedures        No results found for this or any previous visit (from the past 24 hour(s)).    Medications   HYDROmorphone (PF) (DILAUDID) injection 0.5 mg (0.5 mg Intravenous $Given 4/14/24 1024)   ketorolac (TORADOL) injection 30 mg (30 mg Intravenous $Given 4/14/24 0912)   HYDROmorphone (DILAUDID) injection 1 mg (1 mg Intravenous $Given 4/14/24 0915)     This is a 34-year-old male with a known history of back pain, presents with worsening symptoms and now numbness in both legs.  MRI from a month ago did show a small disc protrusion.  I consulted neurosurgery and spoke to Kimberly with Dr. Laurent's group.  She is concerned with the new numbness in the legs, she is wondering if this disc has pushed out more now is causing some cord compression.  She does not think this needs to happen emergently, like we need to transfer the patient but would recommend an urgent MRI to be done this week sometime.  If it does show some now cord compression, she will follow the results and contact the patient and see if there is any surgical options.  She is also recommending discharging patient home on a steroid Medrol Dosepak.  Patient will be sent home with some more pain meds and muscle relaxants.  Patient's pain was able to be managed with the above medications and he would like to try and go home.  Patient will be discharged at this time.    Assessments & Plan (with Medical Decision Making)  Acute exacerbation of chronic back pain, paresthesias     I have reviewed the nursing notes.    I have reviewed the findings, diagnosis, plan and need for follow up with the patient.      New Prescriptions    METHOCARBAMOL (ROBAXIN) 750 MG TABLET    Take 1 tablet (750 mg) by mouth 3 times daily as needed for muscle spasms    METHYLPREDNISOLONE (MEDROL DOSEPAK) 4 MG TABLET THERAPY PACK     Follow Package Directions    OXYCODONE (ROXICODONE) 5 MG TABLET    Take 1 tablet (5 mg) by mouth every 6 hours as needed for pain       Final diagnoses:   Acute exacerbation of chronic low back pain   Paresthesias       4/14/2024   [facility] EMERGENCY DEPT       Raul Sneed MD  04/14/24 8140

## 2024-04-14 NOTE — PROGRESS NOTES
Contacted regarding 33 yo male presenting to ER with low back and bilateral leg numbness.   Had MRI one month ago revealing L5-S1 HNP No saddle numbness, no bowel/bladder trouble. No weakness per ER    MRI lumbar spine 3/19/24    IMPRESSION:  1.  At the L5/S1 level, there is a symmetric disc bulge with a superimposed focal central disc protrusion with mild spinal canal narrowing.  2.  No significant posterior disc bulge or spinal canal narrowing at any other level within the lumbar spine.  3.  Multilevel posterolateral disc disease and facet arthropathy with multilevel neural foraminal narrowing at L4/L5 and L5/S1 as described above.    RECOMMENDATIONS:  Would repeat lumbar MRI (unable to obtain MRI in house at [facility] on weekend)  If neuro intact and pain able to be managed, okay to discharge from ER with plan for lumbar MRI as outpatient this week with neurosurgery outpatient follow up.  Medrol dose pack and discharge.  If pain unable to be controlled or has any neuro deficits, recommend admission at [facility] for lumbar MRI in morning.    NEREIDA Maxwell  [facility]  [address] [address]    Tel [phone]  Pager [phone]

## 2024-04-16 ENCOUNTER — HOSPITAL ENCOUNTER (OUTPATIENT)
Dept: MRI IMAGING | Facility: CLINIC | Age: 34
Discharge: HOME | End: 2024-04-16
Attending: FAMILY MEDICINE | Admitting: FAMILY MEDICINE
Payer: COMMERCIAL

## 2024-04-16 ENCOUNTER — MYC MEDICAL ADVICE (OUTPATIENT)
Dept: NEUROSURGERY | Facility: CLINIC | Age: 34
End: 2024-04-16
Payer: COMMERCIAL

## 2024-04-16 DIAGNOSIS — R20.2 PARESTHESIAS: ICD-10-CM

## 2024-04-16 DIAGNOSIS — G89.29 ACUTE EXACERBATION OF CHRONIC LOW BACK PAIN: ICD-10-CM

## 2024-04-16 DIAGNOSIS — M54.50 ACUTE EXACERBATION OF CHRONIC LOW BACK PAIN: ICD-10-CM

## 2024-04-16 PROCEDURE — 72148 MRI LUMBAR SPINE W/O DYE: CPT

## 2024-04-16 NOTE — TELEPHONE ENCOUNTER
Patient went to ED on 4/14/24 for back and leg pain. Lumbar MRI was recommended then follow-up with neurosurgery as outpatient.     Patient had MRI completed today and is requesting results. Patient is scheduled with Dr. Laurent on 4/25.    [employer] message sent to patient to advise imaging will be reviewed at upcoming appointment.

## 2024-04-17 NOTE — CONFIDENTIAL NOTE
NEUROSURGERY- NEW PREVISIT PLANNING       Record Status/Location     Referring Provider Referral Raul Sneed MD    Diagnosis Referral M54.50, G89.29 (ICD-10-CM) - Acute exacerbation of chronic low back pain   R20.2 (ICD-10-CM) - Paresthesias      MRI (HEAD, NECK, SPINE) Pacs Lumbar 4/16/24 [facility]    CT Na    X-ray Pacs Lumbar 10/8/20 [facility]    INJECTION Scheduled    PHYSICAL THERAPY Encounters 3/29/24   SURGERY Na

## 2024-04-18 ENCOUNTER — OFFICE VISIT (OUTPATIENT)
Dept: NEUROSURGERY | Facility: CLINIC | Age: 34
End: 2024-04-18
Payer: COMMERCIAL

## 2024-04-18 VITALS — SYSTOLIC BLOOD PRESSURE: 97 MMHG | OXYGEN SATURATION: 98 % | DIASTOLIC BLOOD PRESSURE: 65 MMHG | HEART RATE: 80 BPM

## 2024-04-18 DIAGNOSIS — M51.9 DISC DISORDER OF LUMBAR REGION: Primary | ICD-10-CM

## 2024-04-18 PROCEDURE — 99204 OFFICE O/P NEW MOD 45 MIN: CPT | Performed by: NEUROLOGICAL SURGERY

## 2024-04-18 PROCEDURE — G2211 COMPLEX E/M VISIT ADD ON: HCPCS | Performed by: NEUROLOGICAL SURGERY

## 2024-04-18 ASSESSMENT — PAIN SCALES - GENERAL: PAINLEVEL: SEVERE PAIN (7)

## 2024-04-18 NOTE — NURSING NOTE
"Ross Dumas is a 34 year old male who presents for:  Chief Complaint   Patient presents with    RECHECK     Moderate pain in low back, hard to sit for too long or move sometimes        Initial Vitals:  BP 97/65   Pulse 80   SpO2 98%  Estimated body mass index is 28.13 kg/m  as calculated from the following:    Height as of 4/14/24: 5' 8\" (1.727 m).    Weight as of 4/14/24: 185 lb (83.9 kg).. There is no height or weight on file to calculate BSA. BP completed using cuff size: large  Severe Pain (7)    Nursing Comments:     Mateo Ghotra    "

## 2024-04-18 NOTE — LETTER
4/18/2024         RE: Ross Dumas  [id number] [facility] [address]        Dear Colleague,    Thank you for referring your patient, Ross Dumas, to the [facility]. Please see a copy of my visit note below.    I was asked by Dr. Strange to see this patient in consultation    34 year old male with back and leg pain, L5-S1 disc herniation.  Developed throbbing back pain in late February, and underwent MR Lumbar in March which showed L5-S1 disc bulge.  On Saturday night, he developed debilitating, worsened low back pain.  Continued severe, throbbing low back pain, radiating to the coccyx and hips, and paresthesias down the bilateral thighs and calves.  Much worse when he tries to stand and walk, and is limiting all activities.  Medical management including NSAIDs and steroid pack without improvement.  New MR Lumbar, personally reviewed, with L5-S1 moderate disc degeneration, subsequently enlarged central disc herniation with bilateral lateral recess stenosis, and mild L4-5 disc degeneration.       No past medical history on file.  Past Surgical History:   Procedure Laterality Date     HC KNEE SCOPE,AID ANT CRUCIATE REPAIR  11/23/2004    Ruptured ACL graft, hx of allograft ACL reconstruction with small 5 mm longitudinal tear, mid posterior aspect of lateral meniscus.     Social History     Socioeconomic History     Marital status:      Spouse name: Not on file     Number of children: Not on file     Years of education: Not on file     Highest education level: Not on file   Occupational History     Not on file   Tobacco Use     Smoking status: Never     Smokeless tobacco: Never   Vaping Use     Vaping status: Never Used   Substance and Sexual Activity     Alcohol use: No     Drug use: No     Sexual activity: Not on file   Other Topics Concern     Parent/sibling w/ CABG, MI or angioplasty before 65F 55M? Not Asked   Social History Narrative     Not on file     Social  Determinants of Health     Financial Resource Strain: Not on file   Food Insecurity: Not on file   Transportation Needs: Not on file   Physical Activity: Not on file   Stress: Not on file   Social Connections: Not on file   Interpersonal Safety: Not on file   Housing Stability: Not on file     No family history on file.     ROS: 10 point ROS neg other than the symptoms noted above in the HPI.    Physical Exam  BP 97/65   Pulse 80   SpO2 98%   HEENT:  Normocephalic, atraumatic.  PERRLA.  EOM s intact.  Visual fields full to gross exam  Neck:  Supple, non-tender, without lymphadenopathy.  Heart:  No peripheral edema  Lungs:  No SOB  Abdomen:  Non-distended.   Skin:  Warm and dry.  Extremities:  No edema, cyanosis or clubbing.  Psychiatric:  No apparent distress  Musculoskeletal:  Normal bulk and tone    NEUROLOGICAL EXAMINATION:     Mental status:  Alert and Oriented x 3, speech is fluent.  Cranial nerves:  II-XII intact.   Motor:    Shoulder Abduction:  Right:  5/5   Left:  5/5  Biceps:                      Right:  5/5   Left:  5/5  Triceps:                     Right:  5/5   Left:  5/5  Wrist Extensors:       Right:  5/5   Left:  5/5  Wrist Flexors:           Right:  5/5   Left:  5/5  interosseus :            Right:  5/5   Left:  5/5  Hip Flexion:                Right: 5/5  Left:  5/5  Quadriceps:             Right:  5/5  Left:  5/5  Hamstrings:             Right:  5/5  Left:  5/5  Gastroc Soleus:        Right:  5/5  Left:  5/5  Tib/Ant:                      Right:  5/5  Left:  5/5  EHL:                     Right:  5/5  Left:  5/5  Sensation:  Intact  Markedly pain limited gait    A/P:  34 year old male with back and leg pain, L5-S1 disc herniation    I had a discussion with the patient, reviewing the history, symptoms, and imaging  He will get NOE tomorrow  Currently is contraindicated from physical therapy as he could not tolerate due to uncontrolled pain; if his pain improves substantially after injection, we  will start physical therapy  If no improvement with NOE, will need to plan for L5-S1 bilateral midline sparing laminectomy and discectomy  Risks and benefits discussed, including infection, hematoma, CSF leak, recurrent disc herniation, and nerve root injury           Again, thank you for allowing me to participate in the care of your patient.        Sincerely,        Jeffrey Laurent MD

## 2024-04-18 NOTE — PROGRESS NOTES
I was asked by Dr. Strange to see this patient in consultation    34 year old male with back and leg pain, L5-S1 disc herniation.  Developed throbbing back pain in late February, and underwent MR Lumbar in March which showed L5-S1 disc bulge.  On Saturday night, he developed debilitating, worsened low back pain.  Continued severe, throbbing low back pain, radiating to the coccyx and hips, and paresthesias down the bilateral thighs and calves.  Much worse when he tries to stand and walk, and is limiting all activities.  Medical management including NSAIDs and steroid pack without improvement.  New MR Lumbar, personally reviewed, with L5-S1 moderate disc degeneration, subsequently enlarged central disc herniation with bilateral lateral recess stenosis, and mild L4-5 disc degeneration.       No past medical history on file.  Past Surgical History:   Procedure Laterality Date    HC KNEE SCOPE,AID ANT CRUCIATE REPAIR  11/23/2004    Ruptured ACL graft, hx of allograft ACL reconstruction with small 5 mm longitudinal tear, mid posterior aspect of lateral meniscus.     Social History     Socioeconomic History    Marital status:      Spouse name: Not on file    Number of children: Not on file    Years of education: Not on file    Highest education level: Not on file   Occupational History    Not on file   Tobacco Use    Smoking status: Never    Smokeless tobacco: Never   Vaping Use    Vaping status: Never Used   Substance and Sexual Activity    Alcohol use: No    Drug use: No    Sexual activity: Not on file   Other Topics Concern    Parent/sibling w/ CABG, MI or angioplasty before 65F 55M? Not Asked   Social History Narrative    Not on file     Social Determinants of Health     Financial Resource Strain: Not on file   Food Insecurity: Not on file   Transportation Needs: Not on file   Physical Activity: Not on file   Stress: Not on file   Social Connections: Not on file   Interpersonal Safety: Not on file   Housing  Stability: Not on file     No family history on file.     ROS: 10 point ROS neg other than the symptoms noted above in the HPI.    Physical Exam  BP 97/65   Pulse 80   SpO2 98%   HEENT:  Normocephalic, atraumatic.  PERRLA.  EOM s intact.  Visual fields full to gross exam  Neck:  Supple, non-tender, without lymphadenopathy.  Heart:  No peripheral edema  Lungs:  No SOB  Abdomen:  Non-distended.   Skin:  Warm and dry.  Extremities:  No edema, cyanosis or clubbing.  Psychiatric:  No apparent distress  Musculoskeletal:  Normal bulk and tone    NEUROLOGICAL EXAMINATION:     Mental status:  Alert and Oriented x 3, speech is fluent.  Cranial nerves:  II-XII intact.   Motor:    Shoulder Abduction:  Right:  5/5   Left:  5/5  Biceps:                      Right:  5/5   Left:  5/5  Triceps:                     Right:  5/5   Left:  5/5  Wrist Extensors:       Right:  5/5   Left:  5/5  Wrist Flexors:           Right:  5/5   Left:  5/5  interosseus :            Right:  5/5   Left:  5/5  Hip Flexion:                Right: 5/5  Left:  5/5  Quadriceps:             Right:  5/5  Left:  5/5  Hamstrings:             Right:  5/5  Left:  5/5  Gastroc Soleus:        Right:  5/5  Left:  5/5  Tib/Ant:                      Right:  5/5  Left:  5/5  EHL:                     Right:  5/5  Left:  5/5  Sensation:  Intact  Markedly pain limited gait    A/P:  34 year old male with back and leg pain, L5-S1 disc herniation    I had a discussion with the patient, reviewing the history, symptoms, and imaging  He will get NOE tomorrow  Currently is contraindicated from physical therapy as he could not tolerate due to uncontrolled pain; if his pain improves substantially after injection, we will start physical therapy  If no improvement with NOE, will need to plan for L5-S1 bilateral midline sparing laminectomy and discectomy  Risks and benefits discussed, including infection, hematoma, CSF leak, recurrent disc herniation, and nerve root injury

## 2024-04-19 ENCOUNTER — APPOINTMENT (OUTPATIENT)
Dept: GENERAL RADIOLOGY | Facility: CLINIC | Age: 34
End: 2024-04-19
Attending: ANESTHESIOLOGY
Payer: COMMERCIAL

## 2024-04-19 ENCOUNTER — HOSPITAL ENCOUNTER (OUTPATIENT)
Facility: CLINIC | Age: 34
Discharge: HOME | End: 2024-04-19
Attending: ANESTHESIOLOGY | Admitting: ANESTHESIOLOGY
Payer: COMMERCIAL

## 2024-04-19 VITALS
RESPIRATION RATE: 16 BRPM | HEART RATE: 95 BPM | DIASTOLIC BLOOD PRESSURE: 88 MMHG | SYSTOLIC BLOOD PRESSURE: 129 MMHG | OXYGEN SATURATION: 99 % | TEMPERATURE: 97.4 F

## 2024-04-19 DIAGNOSIS — M54.16 LUMBAR RADICULOPATHY: ICD-10-CM

## 2024-04-19 PROCEDURE — 250N000011 HC RX IP 250 OP 636: Performed by: ANESTHESIOLOGY

## 2024-04-19 PROCEDURE — 62323 NJX INTERLAMINAR LMBR/SAC: CPT | Performed by: ANESTHESIOLOGY

## 2024-04-19 PROCEDURE — 999N000179 XR SURGERY CARM FLUORO LESS THAN 5 MIN W STILLS

## 2024-04-19 PROCEDURE — 250N000011 HC RX IP 250 OP 636: Mod: JZ | Performed by: ANESTHESIOLOGY

## 2024-04-19 RX ORDER — TRIAMCINOLONE ACETONIDE 40 MG/ML
INJECTION, SUSPENSION INTRA-ARTICULAR; INTRAMUSCULAR PRN
Status: DISCONTINUED | OUTPATIENT
Start: 2024-04-19 | End: 2024-04-19 | Stop reason: HOSPADM

## 2024-04-19 RX ORDER — IOPAMIDOL 612 MG/ML
INJECTION, SOLUTION INTRATHECAL PRN
Status: DISCONTINUED | OUTPATIENT
Start: 2024-04-19 | End: 2024-04-19 | Stop reason: HOSPADM

## 2024-04-19 ASSESSMENT — ACTIVITIES OF DAILY LIVING (ADL)
ADLS_ACUITY_SCORE: 35
ADLS_ACUITY_SCORE: 33

## 2024-04-19 NOTE — OP NOTE
CHIEF COMPLAINT: Low back and radicular lower extremity pains  INDICATIONS FOR PROCEDURE:  1.This patient suffers from moderate to severe back pain and lower extremity radicular pains.    2.This pain has persisted for more than 4 weeks and is causing significant functional disability when they are trying to perform ADL's.   3. They failed conservative care which consisted of giving this pain time to keren, PT, medications.  Recently had a flare-up and at this point he is barely able to walk.  4. Preoperative NRS pain score was verbally reported to me today as a  9/10.   5. The patients radicular pains correlates to their MRI which shows high intensity zone at L4-5 as well as disc bulges at L4-5 and L5-S1.  The L5-S1 disc herniation is compressing the S1 nerve roots left greater than right.  He also has mixed Modic type I and type II changes at the L5 and S1 endplates.  6.  An order was sent to me to perform the technical component of a lumbar epidural steroid injection.  PROCEDURE: L5-S1 interlaminar epidural steroid injection using fluoroscopic guidance with contrast dye.   PROCEDURE DETAILS: After written informed consent was obtained from the patient, the patient was escorted to the procedure room.  The patient was placed in the prone position.  A  time out  was conducted to verify patient identity, procedure to be performed, side, site, allergies and any special requirements.  The skin over the neck and upper back region were prepped and draped in normal sterile fashion with chloraprep. Fluoroscopy was used to identify the interspace in an AP view and the skin was anesthetized with 2 mL of 1% lidocaine with bicarbonate buffer.  A 20-gauge 3-1/2 inch Tuohy needle was advanced using the loss of resistance technique with preservative free normal saline with fluoroscopic guidance. After negative aspiration for CSF and blood, 1.5 cc of Omnipaque contrast dye was injected revealing the appropriate epidurogram without  evidence of intrathecal or intravascular spread. Following this, a 3-mL solution of 40 mg of Triamcinolone with 2 cc preservative-free normal saline was slowly injected.  After injection of the medication, as the needle tip was withdrawn, it was flushed with local anesthetic.  The patient was monitored with blood pressure and pulse oximetry machines with the assistance of an RN throughout the procedure.  The patient was alert and responsive to questions throughout the procedure.   The patient tolerated the procedure well and was observed in the post-procedural area.  The patient was dismissed without apparent complications.   DIAGNOSIS:  1.  Lumbar radiculitis secondary to an L5-S1 disc herniation left greater than right causing S1 nerve root compression    PLAN:    1. Performed a lumbar interlaminar epidural steroid injection without complications.   2. The patient was instructed follow-up with the referring provider and to call the [facility] if today's procedure is not helpful.    Jey Jung MD  Diplomate of the American Board of Anesthesiology, Pain Medicine

## 2024-04-19 NOTE — DISCHARGE INSTRUCTIONS

## 2024-04-23 DIAGNOSIS — M48.061 SPINAL STENOSIS OF LUMBAR REGION, UNSPECIFIED WHETHER NEUROGENIC CLAUDICATION PRESENT: ICD-10-CM

## 2024-04-23 DIAGNOSIS — M48.061 SPINAL STENOSIS, LUMBAR REGION, WITHOUT NEUROGENIC CLAUDICATION: Primary | ICD-10-CM

## 2024-04-23 NOTE — TELEPHONE ENCOUNTER
Patient completed injection with Dr. Jung 4 days ago and reports little to no relief. He was told to update Dr. Laurent if no improvement in 3-4 days after injection.    Patient would like to proceed with the surgical option, reviewed with Dr. Laurent who has Ok'd this. Dr. Laurent will place case request and our team will be in contact for next steps. Updated patient via [employer].

## 2024-04-24 ENCOUNTER — TELEPHONE (OUTPATIENT)
Dept: NEUROSURGERY | Facility: CLINIC | Age: 34
End: 2024-04-24
Payer: COMMERCIAL

## 2024-04-24 NOTE — TELEPHONE ENCOUNTER
Patient Instructions    Surgery scheduled at [facility] for Lumbar 5 to sacral 1 bilateral midline sparing laminectomy and discectomy with Dr. Laurent    Pre-Operative    Surgical risks: blood clots in the leg or lung, problems urinating, nerve damage, drainage from the incision, infection, stiffness    You will need a Pre-operative physical with primary care physician within 30 days prior to your surgical date.     This is a same day procedure with discharge home day of surgery.    Shower procedure  You will need to shower the night before and morning of surgery using the antimicrobial soap (chlorhexidine) given to you. Please refer to showering instruction sheet in surgery education folder.    Eating/Drinking  Stop all solid foods 8 hours before surgery.  Keep drinking clear liquids until 4 hours before surgery  Clear liquids include water, clear juice, black coffee, or clear tea without milk, Gatorade, clear soda.     Medications  Discontinue Aspirin & NSAIDs (Advil/Ibuprofen, Indocin, Naproxen,Nuprin,Relafen/Nabumetone, Diclofenac,Meloxicam, Aleve, Celebrex) 7 days prior to surgical date. After surgery, do not begin taking these medications until given clearance as it may cause bleeding and interfere with healing.  It is ok to take Tylenol (Acetaminophen) for pain within the 7 days prior to surgery. Example: you could take 1000 mg 3 times per day. Do not exceed 3,000 mg per day.   If you are on chronic pain medication (oxycodone, Percocet, hydrocodone, Vicodin, Norco, Dilaudid, morphine, MS Contin, naltrexone, Suboxone, etc) or have a pain contract we will reach out to your pain clinic to gather your most recent records and recommendations for pain management post-op.  Please ask your provider who manages your chronic pain if they require you to schedule an office visit prior to surgery. Continue obtaining your pain medications from your current provider until surgery. Our team will manage  your acute post-op pain in the hospital and during the recovery period. Your pain team will continue to manage your chronic pain.   Any other medications prescribed, please discuss with your primary care provider at your pre-operative physical     Post-Operative    Incision Care  Look at your incision site every day. You  may need a mirror or family member to help you.   Watch for signs of infection  Redness, swelling, warmth, drainage (Green or yellow drainage (pus) from your incision or increased bloody drainage), and fever of 101 degrees or higher  [facility] [phone]  Remove dressing as instructed upon discharge  Do not apply lotions or ointments to incision  It is okay to shower, just pat the incision dry   No submerging incision in water such as pools, hot tubs, or baths for at least 8 weeks and until the incision is healed    Pain Management  Dealing with pain  As your body heals, you might feel a stabbing, burning, or aching pain. You may also have some numbness.  Everyone feels pain differently, we may ask you to rate your pain using a pain scale. This will let us know how much pain you feel.   Keep in mind that medicine won't take away all of your pain. It helps to try other ways to relax and ease pain.   Things to help with pain  After surgery, we will give you medicine for your pain. These medications work well, but they can make you drowsy, itchy, or sick to your stomach. If we give you narcotics for pain, try to take the pills with food.   For mild to moderate pain, you can take medication such as Tylenol. These can be used with narcotics, but make sure that your narcotic does not contain Tylenol.   Do NOT drive while taking narcotic pain medication  Do NOT drink alcohol while using any pain medication  You can utilize ice as needed (no longer than 20 minutes at one time)  Refills of pain medication: please call the neurosurgery clinic to request 2-3 days before you run out  We will continue to  refill your pain medications for up to 12 weeks after surgery. If you are still needing refills around the 8 week radha, please schedule your Primary Care or Pain Provider before the 12 week post-op radha.  Aspirin & NSAIDS (ex. Ibuprofen, aleve, naproxen): Don't take NSAIDs until 2 weeks after surgery to reduce risk of bleeding and interference with bone healing     Bowel Care  Many people have constipation (hard stools) after surgery. The narcotic pain medication we often prescribed can contribute to constipation. To help prevent constipation: Drink plenty of fluid (8-10 glasses/day); Eat more fiber, such as whole grain bread, bran cereal, and fruits and vegetables; Stay active by walking; Over the counter stool softener may also help.      Activity Restrictions  For the first 6 weeks, no lifting > 10 pounds, limited bending, twisting, or overhead reaching.  Take stairs in moderation   Walking is the best way to start exercise after surgery. Take short frequent walks. You may gradually increase the distance as tolerated. If you feel pain, decrease your activity, but DO NOT stop walking. Walking will help you gain strength, prevent muscle soreness and spasms, and prevent blood clots.  Avoid bed rest and prolonged sitting for longer than 30 minutes (change positions frequently while awake)  No contact sports or high impact activities such as; running/jogging, snowmobile or 4 reaves riding or any other recreational vehicles until after given clearance at one of your follow up visits    Contact clinic right away or go to the Emergency Department if you develop:   Infection (incisional redness, swelling, warmth, drainage, or fever (temp > 101 F))  New injury  Bladder or bowel changes or loss of control    Signs of blood clot:  Swelling and/or warmth in one or both legs  Pain or tenderness in your leg, ankle, foot, or arm   Red or discolored skin     Go to the Emergency Department   If sudden onset of severe headache,  weakness, confusion, change in level of consciousness, pain, or loss of movement.  Chest pain  Trouble breathing     Post-Op Follow Up Appointments  2 week incision check & staple/suture removal with a Neurosurgery Nurse  6 week and 3 month post-op visit with Physican Assistant or Nurse Practitioner     Resources  If you are currently employed, you will need to be off work for 2-4 weeks for recovery and healing.  Please fax any FMLA/short term disability paperwork to [phone] prior to surgery  You may call our clinic when you'd like to return to work and we can provide a work letter  To allow staff adequate time to complete paperwork, please send as soon as possible     [facility]  [facility] - [address] [address]  Telephone:  [phone]       Fax:  [phone]

## 2024-04-24 NOTE — TELEPHONE ENCOUNTER
Reviewed pre- and post-operative instructions as outlined in the After Visit Summary/Patient Instructions with patient.   Staff message sent to CSS team to mail surgery folder to patient's home.     Patient Education Topic: Procedure with Dr. Laurent     Learner(s): Patient    Knowledge Level: Basic    Readiness to Learn: Ready    Method:  Verbal explanation    Outcome: Able to verbalize instructions    Barriers to Learning: No barrier    Covid Testing: n/a    NDI/KASSIE: Confirmation of completion within last 6 months    Smoking Status: Never    Pain Clinic: N/A    STD/FMLA: No  Job Description: . 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 Patient states he has a lot of flexibility in his job. Patient reports he will be able to work from home for a few weeks after surgery. Patient states no work letters / paperwork are needed at this time.   Time Off: 2 weeks     Patient reports he has had nausea with anesthesia with previous surgeries. Advised to discuss with anesthesia team prior to surgery. Patient voiced agreement and understanding.    Patient had the opportunity for questions to be answered. Advised Patient to call clinic with any questions/concerns. Patient states he will get antibacterial soap for pre-surgery skin preparation at PCP pre-op appt. Advised to call clinic if rx is needed. Patient voiced agreement and understanding.

## 2024-04-25 ENCOUNTER — PRE VISIT (OUTPATIENT)
Dept: NEUROSURGERY | Facility: CLINIC | Age: 34
End: 2024-04-25

## 2024-04-30 ENCOUNTER — OFFICE VISIT (OUTPATIENT)
Dept: FAMILY MEDICINE | Facility: CLINIC | Age: 34
End: 2024-04-30
Payer: COMMERCIAL

## 2024-04-30 VITALS
HEART RATE: 93 BPM | WEIGHT: 177.8 LBS | SYSTOLIC BLOOD PRESSURE: 128 MMHG | OXYGEN SATURATION: 98 % | DIASTOLIC BLOOD PRESSURE: 80 MMHG | TEMPERATURE: 98 F | BODY MASS INDEX: 26.95 KG/M2 | HEIGHT: 68 IN

## 2024-04-30 DIAGNOSIS — Z01.818 PREOP GENERAL PHYSICAL EXAM: Primary | ICD-10-CM

## 2024-04-30 DIAGNOSIS — M54.41 CHRONIC BILATERAL LOW BACK PAIN WITH BILATERAL SCIATICA: ICD-10-CM

## 2024-04-30 DIAGNOSIS — G89.29 CHRONIC BILATERAL LOW BACK PAIN WITH BILATERAL SCIATICA: ICD-10-CM

## 2024-04-30 DIAGNOSIS — M54.42 CHRONIC BILATERAL LOW BACK PAIN WITH BILATERAL SCIATICA: ICD-10-CM

## 2024-04-30 DIAGNOSIS — Z23 NEED FOR VACCINATION: ICD-10-CM

## 2024-04-30 DIAGNOSIS — Z71.89 ADVANCED DIRECTIVES, COUNSELING/DISCUSSION: ICD-10-CM

## 2024-04-30 LAB — HGB BLD-MCNC: 15.8 G/DL (ref 13.3–17.7)

## 2024-04-30 PROCEDURE — 85018 HEMOGLOBIN: CPT | Performed by: NURSE PRACTITIONER

## 2024-04-30 PROCEDURE — 99214 OFFICE O/P EST MOD 30 MIN: CPT | Performed by: NURSE PRACTITIONER

## 2024-04-30 PROCEDURE — G2211 COMPLEX E/M VISIT ADD ON: HCPCS | Performed by: NURSE PRACTITIONER

## 2024-04-30 PROCEDURE — 36415 COLL VENOUS BLD VENIPUNCTURE: CPT | Performed by: NURSE PRACTITIONER

## 2024-04-30 ASSESSMENT — PAIN SCALES - GENERAL: PAINLEVEL: SEVERE PAIN (6)

## 2024-04-30 NOTE — PROGRESS NOTES
03/29/24 0500   Appointment Info   Signing clinician's name / credentials Heber Alvarado, PT, DPT, OCS   Medical Diagnosis Chronic B low back pain with B sciatica.   PT Tx Diagnosis Chronic B low back pain with B sciatica.   Progress Note/Certification   Onset of illness/injury or Date of Surgery 02/15/24   Therapy Frequency 1x/week   Predicted Duration 8 weeks   Progress Note Completed Date 03/29/24   GOALS   PT Goals 2   PT Goal 1   Goal Identifier HEP   Goal Description Pt will be independent with HEP in order to improve lumbar motor control and return to activity.   Target Date 04/26/24   PT Goal 2   Goal Identifier Walk/run program   Goal Description Pt will demonstrated ability to progress walk to run program in order to return to prior level of activity.   Target Date 05/24/24   Subjective Report   Subjective Report See antonette.   Treatment Interventions (PT)   Interventions Therapeutic Procedure/Exercise;Neuromuscular Re-education   Therapeutic Procedure/Exercise   Therapeutic Procedures: strength, endurance, ROM, flexibility minutes (28270) 10   Ther Proc 1 - Details Performed repeated flexion with increase in symptoms.  Pt performed repeated extension in standing 2x10 reps with symptoms decreasing to .5/10 and reduced to marble sized area.  Discussed performing standing repeated extension every couple of hours  and monitor symptoms.   Skilled Intervention See above   Patient Response/Progress Centralized and decreased symptoms.   Neuromuscular Re-education   Neuromuscular re-ed of mvmt, balance, coord, kinesthetic sense, posture, proprioception minutes (21937) 10   Neuro Re-ed 1 - Details Discussed principles of centralization vs peripheralization.  Pt appears to be an extension responder given history and ability to nearly abolish symptoms with extension program.  Discussed avoiding activities that peripheralize, especially bending and lifting.   Skilled Intervention See above   Patient Response/Progress  Demonstrated understanding.   Eval/Assessments   PT Eval, Low Complexity Minutes (32679) 15   Education   Learner/Method Patient;Demonstration;Pictures/Video;No Barriers to Learning   Education Comments HEP   Plan   Home program standing repeated extension x 10.   Updates to plan of care 1x/week   Plan for next session F/u extension program, if symptoms are manageable gradually increase general strengthening exercises with avoidance towards loaded flexion.   Total Session Time   Timed Code Treatment Minutes 20   Total Treatment Time (sum of timed and untimed services) 35         DISCHARGE  Reason for Discharge: Patient has not made expected progress due to interrupted treatment attendance.    Equipment Issued: none    Discharge Plan: Other services: surgery.    Referring Provider:  Rikki Strange

## 2024-04-30 NOTE — PROGRESS NOTES
Preoperative Evaluation  [facility] [place]  [address], [address]  [place] [address]  Phone: [phone]  Fax: [phone]  Primary Provider: Rikki Strange  Pre-op Performing Provider: MITRA SAVAGE  Apr 30, 2024       Ross is a 34 year old, presenting for the following:  Pre-Op Exam        4/30/2024     1:58 PM   Additional Questions   Roomed by Jessica ABRAMS CMA   Accompanied by Self         4/30/2024     1:58 PM   Patient Reported Additional Medications   Patient reports taking the following new medications n/a     Surgical Information  Surgery/Procedure: Lumbar 5 to Sacral 1 bilateral midline sparing laminectomy and discectomy   Surgery Location: [facility]   Surgeon: Jeffrey Laurent MD   Surgery Date: 05/03/24  Time of Surgery: 5:30am   Where patient plans to recover: At home with family  Fax number for surgical facility: Note does not need to be faxed, will be available electronically in Epic.    Assessment & Plan     The proposed surgical procedure is considered INTERMEDIATE risk.    Preop general physical exam    - Hemoglobin; Future    Chronic bilateral low back pain with bilateral sciatica      Advanced directives, counseling/discussion  Declined need for form    Need for vaccination  Updated today.   - TDAP 7+ (ADACEL,BOOSTRIX)            - No identified additional risk factors other than previously addressed    Antiplatelet or Anticoagulation Medication Instructions   - Patient is on no antiplatelet or anticoagulation medications.    Additional Medication Instructions  Patient is on no additional chronic medications  Only prn use of Muscle relaxers advised until surgery.     Recommendation  APPROVAL GIVEN to proceed with proposed procedure, without further diagnostic evaluation.          Subjective       HPI related to upcoming procedure: recurrent LBP since Feb. 2024 with disc herniation, failed epidural injection, necessitating surgical intervention.          4/24/2024     9:44 AM   Preop Questions   1. Have you ever had a heart attack or stroke? No   2. Have you ever had surgery on your heart or blood vessels, such as a stent placement, a coronary artery bypass, or surgery on an artery in your head, neck, heart, or legs? No   3. Do you have chest pain with activity? No   4. Do you have a history of  heart failure? No   5. Do you currently have a cold, bronchitis or symptoms of other infection? No   6. Do you have a cough, shortness of breath, or wheezing? No   7. Do you or anyone in your family have previous history of blood clots? No   8. Do you or does anyone in your family have a serious bleeding problem such as prolonged bleeding following surgeries or cuts? No   9. Have you ever had problems with anemia or been told to take iron pills? No   10. Have you had any abnormal blood loss such as black, tarry or bloody stools? No   11. Have you ever had a blood transfusion? No   12. Are you willing to have a blood transfusion if it is medically needed before, during, or after your surgery? Yes   13. Have you or any of your relatives ever had problems with anesthesia? YES - personal HX of PONV   14. Do you have sleep apnea, excessive snoring or daytime drowsiness? No   15. Do you have any artifical heart valves or other implanted medical devices like a pacemaker, defibrillator, or continuous glucose monitor? No   16. Do you have artificial joints? No   17. Are you allergic to latex? No       Health Care Directive  Patient does not have a Health Care Directive or Living Will: Discussed advance care planning with patient; however, patient declined at this time.    Preoperative Review of    reviewed - Controlled substances on the list- not currently using.       Status of Chronic Conditions:  See problem list for active medical problems.  Problems all longstanding and stable, except as noted/documented.  See ROS for pertinent symptoms related to these  conditions.    Patient Active Problem List    Diagnosis Date Noted    Chronic bilateral low back pain with bilateral sciatica 03/29/2024     Priority: Medium    Adjustment disorder with anxious mood 05/30/2019     Priority: Medium    Allergic conjunctivitis 05/09/2014     Priority: Medium    Allergic rhinitis due to allergen 05/09/2014     Priority: Medium    Seasonal allergies 05/09/2014     Priority: Medium    Biceps tendonitis on left 08/19/2013     Priority: Medium      Past Medical History:   Diagnosis Date    Arthritis 2019    knees    PONV (postoperative nausea and vomiting)      Past Surgical History:   Procedure Laterality Date    ARTHROSCOPIC REPAIR ACL      X2 2005- Right, 2006- Left    HC KNEE SCOPE,AID ANT CRUCIATE REPAIR Left 11/23/2004    Ruptured ACL graft, hx of allograft ACL reconstruction with small 5 mm longitudinal tear, mid posterior aspect of lateral meniscus.    INJECT EPIDURAL LUMBAR N/A 04/19/2024    Procedure: Lumbar 5 - Sacral 1 interlaminar epidural steroid injection using fluoroscopic guidance with contrast dye.;  Surgeon: Jey Jung MD;  Location: [facility] OR    VASECTOMY  2020     Current Outpatient Medications   Medication Sig Dispense Refill    cyclobenzaprine (FLEXERIL) 5 MG tablet Take 1 tablet (5 mg) by mouth 3 times daily as needed for muscle spasms 30 tablet 2    methocarbamol (ROBAXIN) 750 MG tablet Take 1 tablet (750 mg) by mouth 3 times daily as needed for muscle spasms 20 tablet 0    meloxicam (MOBIC) 15 MG tablet Take 1 tablet (15 mg) by mouth daily 30 tablet 2       Allergies   Allergen Reactions    Penicillins      Thinks he had a reaction when he was a child but not sure of the reaction (just what his mom told him)    Seasonal Allergies         Social History     Tobacco Use    Smoking status: Never    Smokeless tobacco: Never   Substance Use Topics    Alcohol use: No       History   Drug Use No         Review of Systems    Review of Systems  Constitutional, HEENT,  "cardiovascular, pulmonary, GI, , musculoskeletal, neuro, skin, endocrine and psych systems are negative, except as otherwise noted.    Objective    /80   Pulse 93   Temp 98  F (36.7  C) (Temporal)   Ht 1.727 m (5' 8\")   Wt 80.6 kg (177 lb 12.8 oz)   SpO2 98%   BMI 27.03 kg/m     Estimated body mass index is 27.03 kg/m  as calculated from the following:    Height as of this encounter: 1.727 m (5' 8\").    Weight as of this encounter: 80.6 kg (177 lb 12.8 oz).  Physical Exam  GENERAL: alert and no distress  EYES: Eyes grossly normal to inspection, PERRL and conjunctivae and sclerae normal  HENT: ear canals and TM's normal, nose and mouth without ulcers or lesions  NECK: no adenopathy, no asymmetry, masses, or scars  RESP: lungs clear to auscultation - no rales, rhonchi or wheezes  CV: regular rate and rhythm, normal S1 S2, no S3 or S4, no murmur, click or rub, no peripheral edema  ABDOMEN: soft, nontender, no hepatosplenomegaly, no masses and bowel sounds normal  MS: no gross musculoskeletal defects noted, no edema, slowed ROM of back  SKIN: no suspicious lesions or rashes  NEURO: Normal strength and tone, mentation intact and speech normal, patellar reflexes 3/3, normal LE strength, sensation,   PSYCH: mentation appears normal, affect normal/bright    Recent Labs   Lab Test 06/02/23  0930   HGB 14.3         POTASSIUM 4.0   CR 0.96        Diagnostics  Labs pending at this time.  Results will be reviewed when available.   No EKG required, no history of coronary heart disease, significant arrhythmia, peripheral arterial disease or other structural heart disease.    Revised Cardiac Risk Index (RCRI)  The patient has the following serious cardiovascular risks for perioperative complications:   - No serious cardiac risks = 0 points     RCRI Interpretation: 0 points: Class I (very low risk - 0.4% complication rate)         Signed Electronically by: LENARD Lees CNP  Copy of this evaluation " report is provided to requesting physician.

## 2024-04-30 NOTE — RESULT ENCOUNTER NOTE
Ross,  I have reviewed your labs, and they are all normal. If you have questions, please notify me through MyChart or a telephone call.   Sherry Haskins, DNP

## 2024-05-02 ENCOUNTER — VIRTUAL VISIT (OUTPATIENT)
Dept: NEUROSURGERY | Facility: CLINIC | Age: 34
End: 2024-05-02
Payer: COMMERCIAL

## 2024-05-02 DIAGNOSIS — M54.16 LUMBAR RADICULOPATHY: Primary | ICD-10-CM

## 2024-05-02 PROCEDURE — 99442 PR PHYSICIAN TELEPHONE EVALUATION 11-20 MIN: CPT | Mod: 93 | Performed by: NEUROLOGICAL SURGERY

## 2024-05-02 NOTE — PROGRESS NOTES
34 year old male with back and leg pain, L5-S1 disc herniation.  Developed throbbing back pain in late February, and underwent MR Lumbar in March which showed L5-S1 disc bulge.  On Saturday night, he developed debilitating, worsened low back pain.  Continued severe, throbbing low back pain, radiating to the coccyx and hips, and paresthesias down the bilateral thighs and calves.  Much worse when he tries to stand and walk, and is limiting all activities.  Medical management including NSAIDs and steroid pack without improvement.  New MR Lumbar, personally reviewed, with L5-S1 moderate disc degeneration, subsequently enlarged central disc herniation with bilateral lateral recess stenosis, and mild L4-5 disc degeneration.    Telephone encounter for follow up.  Continued pain as described above.  Underwent NOE with only minor improvement.       Past Medical History:   Diagnosis Date    Arthritis 2019    knees    PONV (postoperative nausea and vomiting)      Past Surgical History:   Procedure Laterality Date    ARTHROSCOPIC REPAIR ACL      X2 2005- Right, 2006- Left    HC KNEE SCOPE,AID ANT CRUCIATE REPAIR Left 11/23/2004    Ruptured ACL graft, hx of allograft ACL reconstruction with small 5 mm longitudinal tear, mid posterior aspect of lateral meniscus.    INJECT EPIDURAL LUMBAR N/A 04/19/2024    Procedure: Lumbar 5 - Sacral 1 interlaminar epidural steroid injection using fluoroscopic guidance with contrast dye.;  Surgeon: Jey Jung MD;  Location: [facility]    VASECTOMY  2020     Social History     Socioeconomic History    Marital status:      Spouse name: Not on file    Number of children: Not on file    Years of education: Not on file    Highest education level: Not on file   Occupational History    Not on file   Tobacco Use    Smoking status: Never    Smokeless tobacco: Never   Vaping Use    Vaping status: Never Used   Substance and Sexual Activity    Alcohol use: No    Drug use: No    Sexual activity: Yes      Partners: Female     Birth control/protection: Male Surgical, None   Other Topics Concern    Parent/sibling w/ CABG, MI or angioplasty before 65F 55M? No   Social History Narrative    Not on file     Social Determinants of Health     Financial Resource Strain: Not on file   Food Insecurity: Not on file   Transportation Needs: Not on file   Physical Activity: Not on file   Stress: Not on file   Social Connections: Not on file   Interpersonal Safety: Not on file   Housing Stability: Not on file     No family history on file.     ROS: 10 point ROS neg other than the symptoms noted above in the HPI.    Physical Exam  There were no vitals taken for this visit.  HEENT:  Normocephalic, atraumatic.  PERRLA.  EOM s intact.  Visual fields full to gross exam  Neck:  Supple, non-tender, without lymphadenopathy.  Heart:  No peripheral edema  Lungs:  No SOB  Abdomen:  Non-distended.   Skin:  Warm and dry.  Extremities:  No edema, cyanosis or clubbing.  Psychiatric:  No apparent distress  Musculoskeletal:  Normal bulk and tone    NEUROLOGICAL EXAMINATION:     Mental status:  Alert and Oriented x 3, speech is fluent.  Cranial nerves:  II-XII intact.   Motor:    Shoulder Abduction:  Right:  5/5   Left:  5/5  Biceps:                      Right:  5/5   Left:  5/5  Triceps:                     Right:  5/5   Left:  5/5  Wrist Extensors:       Right:  5/5   Left:  5/5  Wrist Flexors:           Right:  5/5   Left:  5/5  interosseus :            Right:  5/5   Left:  5/5  Hip Flexion:                Right: 5/5  Left:  5/5  Quadriceps:             Right:  5/5  Left:  5/5  Hamstrings:             Right:  5/5  Left:  5/5  Gastroc Soleus:        Right:  5/5  Left:  5/5  Tib/Ant:                      Right:  5/5  Left:  5/5  EHL:                     Right:  5/5  Left:  5/5  Sensation:  Intact  Markedly pain limited gait    A/P:  34 year old male with back and leg pain, L5-S1 disc herniation    Had a discussion regarding specifics of surgical  technique  Scheduled for OR tomorrow    15 minutes telephone discussion  Provider in clinic and patient off site

## 2024-05-02 NOTE — LETTER
5/2/2024         RE: Ross Dumas  [address] [address]        Dear Colleague,    Thank you for referring your patient, Ross Dumas, to the [facility]. Please see a copy of my visit note below.    34 year old male with back and leg pain, L5-S1 disc herniation.  Developed throbbing back pain in late February, and underwent MR Lumbar in March which showed L5-S1 disc bulge.  On Saturday night, he developed debilitating, worsened low back pain.  Continued severe, throbbing low back pain, radiating to the coccyx and hips, and paresthesias down the bilateral thighs and calves.  Much worse when he tries to stand and walk, and is limiting all activities.  Medical management including NSAIDs and steroid pack without improvement.  New MR Lumbar, personally reviewed, with L5-S1 moderate disc degeneration, subsequently enlarged central disc herniation with bilateral lateral recess stenosis, and mild L4-5 disc degeneration.    Telephone encounter for follow up.  Continued pain as described above.  Underwent NOE with only minor improvement.       Past Medical History:   Diagnosis Date     Arthritis 2019    knees     PONV (postoperative nausea and vomiting)      Past Surgical History:   Procedure Laterality Date     ARTHROSCOPIC REPAIR ACL      X2 2005- Right, 2006- Left     HC KNEE SCOPE,AID ANT CRUCIATE REPAIR Left 11/23/2004    Ruptured ACL graft, hx of allograft ACL reconstruction with small 5 mm longitudinal tear, mid posterior aspect of lateral meniscus.     INJECT EPIDURAL LUMBAR N/A 04/19/2024    Procedure: Lumbar 5 - Sacral 1 interlaminar epidural steroid injection using fluoroscopic guidance with contrast dye.;  Surgeon: Jey Jung MD;  Location: [facility]     VASECTOMY  2020     Social History     Socioeconomic History     Marital status:      Spouse name: Not on file     Number of children: Not on file     Years of education: Not on file     Highest  education level: Not on file   Occupational History     Not on file   Tobacco Use     Smoking status: Never     Smokeless tobacco: Never   Vaping Use     Vaping status: Never Used   Substance and Sexual Activity     Alcohol use: No     Drug use: No     Sexual activity: Yes     Partners: Female     Birth control/protection: Male Surgical, None   Other Topics Concern     Parent/sibling w/ CABG, MI or angioplasty before 65F 55M? No   Social History Narrative     Not on file     Social Determinants of Health     Financial Resource Strain: Not on file   Food Insecurity: Not on file   Transportation Needs: Not on file   Physical Activity: Not on file   Stress: Not on file   Social Connections: Not on file   Interpersonal Safety: Not on file   Housing Stability: Not on file     No family history on file.     ROS: 10 point ROS neg other than the symptoms noted above in the HPI.    Physical Exam  There were no vitals taken for this visit.  HEENT:  Normocephalic, atraumatic.  PERRLA.  EOM s intact.  Visual fields full to gross exam  Neck:  Supple, non-tender, without lymphadenopathy.  Heart:  No peripheral edema  Lungs:  No SOB  Abdomen:  Non-distended.   Skin:  Warm and dry.  Extremities:  No edema, cyanosis or clubbing.  Psychiatric:  No apparent distress  Musculoskeletal:  Normal bulk and tone    NEUROLOGICAL EXAMINATION:     Mental status:  Alert and Oriented x 3, speech is fluent.  Cranial nerves:  II-XII intact.   Motor:    Shoulder Abduction:  Right:  5/5   Left:  5/5  Biceps:                      Right:  5/5   Left:  5/5  Triceps:                     Right:  5/5   Left:  5/5  Wrist Extensors:       Right:  5/5   Left:  5/5  Wrist Flexors:           Right:  5/5   Left:  5/5  interosseus :            Right:  5/5   Left:  5/5  Hip Flexion:                Right: 5/5  Left:  5/5  Quadriceps:             Right:  5/5  Left:  5/5  Hamstrings:             Right:  5/5  Left:  5/5  Gastroc Soleus:        Right:  5/5  Left:   5/5  Tib/Ant:                      Right:  5/5  Left:  5/5  EHL:                     Right:  5/5  Left:  5/5  Sensation:  Intact  Markedly pain limited gait    A/P:  34 year old male with back and leg pain, L5-S1 disc herniation    Had a discussion regarding specifics of surgical technique  Scheduled for OR tomorrow    15 minutes telephone discussion  Provider in clinic and patient off site          Again, thank you for allowing me to participate in the care of your patient.        Sincerely,        Jeffrey Laurent MD

## 2024-05-03 ENCOUNTER — HOSPITAL ENCOUNTER (OUTPATIENT)
Facility: CLINIC | Age: 34
Discharge: HOME | End: 2024-05-03
Attending: NEUROLOGICAL SURGERY | Admitting: NEUROLOGICAL SURGERY
Payer: COMMERCIAL

## 2024-05-03 ENCOUNTER — ANESTHESIA EVENT (OUTPATIENT)
Dept: SURGERY | Facility: CLINIC | Age: 34
End: 2024-05-03
Payer: COMMERCIAL

## 2024-05-03 ENCOUNTER — APPOINTMENT (OUTPATIENT)
Dept: GENERAL RADIOLOGY | Facility: CLINIC | Age: 34
End: 2024-05-03
Attending: NEUROLOGICAL SURGERY
Payer: COMMERCIAL

## 2024-05-03 ENCOUNTER — ANESTHESIA (OUTPATIENT)
Dept: SURGERY | Facility: CLINIC | Age: 34
End: 2024-05-03
Payer: COMMERCIAL

## 2024-05-03 VITALS
HEART RATE: 64 BPM | BODY MASS INDEX: 26.45 KG/M2 | DIASTOLIC BLOOD PRESSURE: 74 MMHG | TEMPERATURE: 97 F | OXYGEN SATURATION: 99 % | SYSTOLIC BLOOD PRESSURE: 111 MMHG | RESPIRATION RATE: 16 BRPM | WEIGHT: 174.5 LBS | HEIGHT: 68 IN

## 2024-05-03 DIAGNOSIS — M54.16 LUMBAR RADICULOPATHY: ICD-10-CM

## 2024-05-03 DIAGNOSIS — G89.29 CHRONIC BILATERAL LOW BACK PAIN WITH BILATERAL SCIATICA: ICD-10-CM

## 2024-05-03 DIAGNOSIS — M54.41 CHRONIC BILATERAL LOW BACK PAIN WITH BILATERAL SCIATICA: ICD-10-CM

## 2024-05-03 DIAGNOSIS — R11.0 POSTOPERATIVE NAUSEA: Primary | ICD-10-CM

## 2024-05-03 DIAGNOSIS — Z98.890 S/P DISCECTOMY: Primary | ICD-10-CM

## 2024-05-03 DIAGNOSIS — M54.42 CHRONIC BILATERAL LOW BACK PAIN WITH BILATERAL SCIATICA: ICD-10-CM

## 2024-05-03 DIAGNOSIS — Z98.890 POSTOPERATIVE NAUSEA: Primary | ICD-10-CM

## 2024-05-03 PROCEDURE — 258N000003 HC RX IP 258 OP 636: Performed by: NURSE ANESTHETIST, CERTIFIED REGISTERED

## 2024-05-03 PROCEDURE — 63047 LAM FACETEC & FORAMOT LUMBAR: CPT | Mod: AS | Performed by: NURSE PRACTITIONER

## 2024-05-03 PROCEDURE — 999N000141 HC STATISTIC PRE-PROCEDURE NURSING ASSESSMENT: Performed by: NEUROLOGICAL SURGERY

## 2024-05-03 PROCEDURE — 999N000179 XR SURGERY CARM FLUORO LESS THAN 5 MIN W STILLS

## 2024-05-03 PROCEDURE — 370N000017 HC ANESTHESIA TECHNICAL FEE, PER MIN: Performed by: NEUROLOGICAL SURGERY

## 2024-05-03 PROCEDURE — 250N000011 HC RX IP 250 OP 636: Performed by: NURSE ANESTHETIST, CERTIFIED REGISTERED

## 2024-05-03 PROCEDURE — 250N000011 HC RX IP 250 OP 636: Mod: JZ | Performed by: NEUROLOGICAL SURGERY

## 2024-05-03 PROCEDURE — 63047 LAM FACETEC & FORAMOT LUMBAR: CPT | Performed by: NEUROLOGICAL SURGERY

## 2024-05-03 PROCEDURE — 63047 LAM FACETEC & FORAMOT LUMBAR: CPT | Performed by: ANESTHESIOLOGY

## 2024-05-03 PROCEDURE — 710N000009 HC RECOVERY PHASE 1, LEVEL 1, PER MIN: Performed by: NEUROLOGICAL SURGERY

## 2024-05-03 PROCEDURE — 710N000012 HC RECOVERY PHASE 2, PER MINUTE: Performed by: NEUROLOGICAL SURGERY

## 2024-05-03 PROCEDURE — 250N000009 HC RX 250: Performed by: NURSE ANESTHETIST, CERTIFIED REGISTERED

## 2024-05-03 PROCEDURE — 250N000009 HC RX 250: Performed by: NEUROLOGICAL SURGERY

## 2024-05-03 PROCEDURE — 272N000001 HC OR GENERAL SUPPLY STERILE: Performed by: NEUROLOGICAL SURGERY

## 2024-05-03 PROCEDURE — 250N000011 HC RX IP 250 OP 636: Performed by: NURSE PRACTITIONER

## 2024-05-03 PROCEDURE — 360N000084 HC SURGERY LEVEL 4 W/ FLUORO, PER MIN: Performed by: NEUROLOGICAL SURGERY

## 2024-05-03 PROCEDURE — 63047 LAM FACETEC & FORAMOT LUMBAR: CPT | Performed by: NURSE ANESTHETIST, CERTIFIED REGISTERED

## 2024-05-03 PROCEDURE — 250N000013 HC RX MED GY IP 250 OP 250 PS 637: Performed by: NURSE PRACTITIONER

## 2024-05-03 RX ORDER — HYDROMORPHONE HYDROCHLORIDE 1 MG/ML
INJECTION, SOLUTION INTRAMUSCULAR; INTRAVENOUS; SUBCUTANEOUS PRN
Status: DISCONTINUED | OUTPATIENT
Start: 2024-05-03 | End: 2024-05-03

## 2024-05-03 RX ORDER — GABAPENTIN 300 MG/1
300 CAPSULE ORAL
Status: COMPLETED | OUTPATIENT
Start: 2024-05-03 | End: 2024-05-03

## 2024-05-03 RX ORDER — METHYLPREDNISOLONE ACETATE 40 MG/ML
INJECTION, SUSPENSION INTRA-ARTICULAR; INTRALESIONAL; INTRAMUSCULAR; SOFT TISSUE PRN
Status: DISCONTINUED | OUTPATIENT
Start: 2024-05-03 | End: 2024-05-03 | Stop reason: HOSPADM

## 2024-05-03 RX ORDER — METHOCARBAMOL 750 MG/1
750 TABLET, FILM COATED ORAL 3 TIMES DAILY PRN
Qty: 40 TABLET | Refills: 0 | Status: SHIPPED | OUTPATIENT
Start: 2024-05-03 | End: 2024-05-23

## 2024-05-03 RX ORDER — NALOXONE HYDROCHLORIDE 0.4 MG/ML
0.1 INJECTION, SOLUTION INTRAMUSCULAR; INTRAVENOUS; SUBCUTANEOUS
Status: DISCONTINUED | OUTPATIENT
Start: 2024-05-03 | End: 2024-05-03 | Stop reason: HOSPADM

## 2024-05-03 RX ORDER — PROPOFOL 10 MG/ML
INJECTION, EMULSION INTRAVENOUS PRN
Status: DISCONTINUED | OUTPATIENT
Start: 2024-05-03 | End: 2024-05-03

## 2024-05-03 RX ORDER — FENTANYL CITRATE 50 UG/ML
25 INJECTION, SOLUTION INTRAMUSCULAR; INTRAVENOUS EVERY 5 MIN PRN
Status: DISCONTINUED | OUTPATIENT
Start: 2024-05-03 | End: 2024-05-03 | Stop reason: HOSPADM

## 2024-05-03 RX ORDER — BUPIVACAINE HYDROCHLORIDE AND EPINEPHRINE 5; 5 MG/ML; UG/ML
INJECTION, SOLUTION PERINEURAL PRN
Status: DISCONTINUED | OUTPATIENT
Start: 2024-05-03 | End: 2024-05-03 | Stop reason: HOSPADM

## 2024-05-03 RX ORDER — DEXAMETHASONE SODIUM PHOSPHATE 4 MG/ML
4 INJECTION, SOLUTION INTRA-ARTICULAR; INTRALESIONAL; INTRAMUSCULAR; INTRAVENOUS; SOFT TISSUE
Status: DISCONTINUED | OUTPATIENT
Start: 2024-05-03 | End: 2024-05-03 | Stop reason: HOSPADM

## 2024-05-03 RX ORDER — ONDANSETRON 4 MG/1
4 TABLET, ORALLY DISINTEGRATING ORAL EVERY 6 HOURS PRN
Qty: 20 TABLET | Refills: 0 | Status: SHIPPED | OUTPATIENT
Start: 2024-05-03 | End: 2024-08-01

## 2024-05-03 RX ORDER — ONDANSETRON 4 MG/1
4 TABLET, ORALLY DISINTEGRATING ORAL EVERY 30 MIN PRN
Status: DISCONTINUED | OUTPATIENT
Start: 2024-05-03 | End: 2024-05-03 | Stop reason: HOSPADM

## 2024-05-03 RX ORDER — FENTANYL CITRATE 50 UG/ML
50 INJECTION, SOLUTION INTRAMUSCULAR; INTRAVENOUS EVERY 5 MIN PRN
Status: DISCONTINUED | OUTPATIENT
Start: 2024-05-03 | End: 2024-05-03 | Stop reason: HOSPADM

## 2024-05-03 RX ORDER — ONDANSETRON 2 MG/ML
INJECTION INTRAMUSCULAR; INTRAVENOUS PRN
Status: DISCONTINUED | OUTPATIENT
Start: 2024-05-03 | End: 2024-05-03

## 2024-05-03 RX ORDER — CLINDAMYCIN PHOSPHATE 900 MG/50ML
900 INJECTION, SOLUTION INTRAVENOUS
Status: COMPLETED | OUTPATIENT
Start: 2024-05-03 | End: 2024-05-03

## 2024-05-03 RX ORDER — CLINDAMYCIN PHOSPHATE 900 MG/50ML
900 INJECTION, SOLUTION INTRAVENOUS SEE ADMIN INSTRUCTIONS
Status: DISCONTINUED | OUTPATIENT
Start: 2024-05-03 | End: 2024-05-03 | Stop reason: HOSPADM

## 2024-05-03 RX ORDER — LIDOCAINE HYDROCHLORIDE 20 MG/ML
INJECTION, SOLUTION INFILTRATION; PERINEURAL PRN
Status: DISCONTINUED | OUTPATIENT
Start: 2024-05-03 | End: 2024-05-03

## 2024-05-03 RX ORDER — HYDROMORPHONE HCL IN WATER/PF 6 MG/30 ML
0.4 PATIENT CONTROLLED ANALGESIA SYRINGE INTRAVENOUS EVERY 5 MIN PRN
Status: DISCONTINUED | OUTPATIENT
Start: 2024-05-03 | End: 2024-05-03 | Stop reason: HOSPADM

## 2024-05-03 RX ORDER — HYDROMORPHONE HCL IN WATER/PF 6 MG/30 ML
0.2 PATIENT CONTROLLED ANALGESIA SYRINGE INTRAVENOUS EVERY 5 MIN PRN
Status: DISCONTINUED | OUTPATIENT
Start: 2024-05-03 | End: 2024-05-03 | Stop reason: HOSPADM

## 2024-05-03 RX ORDER — DEXAMETHASONE SODIUM PHOSPHATE 4 MG/ML
INJECTION, SOLUTION INTRA-ARTICULAR; INTRALESIONAL; INTRAMUSCULAR; INTRAVENOUS; SOFT TISSUE PRN
Status: DISCONTINUED | OUTPATIENT
Start: 2024-05-03 | End: 2024-05-03

## 2024-05-03 RX ORDER — PROPOFOL 10 MG/ML
INJECTION, EMULSION INTRAVENOUS CONTINUOUS PRN
Status: DISCONTINUED | OUTPATIENT
Start: 2024-05-03 | End: 2024-05-03

## 2024-05-03 RX ORDER — SODIUM CHLORIDE, SODIUM LACTATE, POTASSIUM CHLORIDE, CALCIUM CHLORIDE 600; 310; 30; 20 MG/100ML; MG/100ML; MG/100ML; MG/100ML
INJECTION, SOLUTION INTRAVENOUS CONTINUOUS
Status: DISCONTINUED | OUTPATIENT
Start: 2024-05-03 | End: 2024-05-03 | Stop reason: HOSPADM

## 2024-05-03 RX ORDER — OXYCODONE HYDROCHLORIDE 5 MG/1
5-10 TABLET ORAL EVERY 6 HOURS PRN
Qty: 40 TABLET | Refills: 0 | Status: SHIPPED | OUTPATIENT
Start: 2024-05-03 | End: 2024-08-01

## 2024-05-03 RX ORDER — AMOXICILLIN 250 MG
1 CAPSULE ORAL 2 TIMES DAILY PRN
Qty: 20 TABLET | Refills: 0 | Status: SHIPPED | OUTPATIENT
Start: 2024-05-03 | End: 2024-08-01

## 2024-05-03 RX ORDER — SODIUM CHLORIDE, SODIUM LACTATE, POTASSIUM CHLORIDE, CALCIUM CHLORIDE 600; 310; 30; 20 MG/100ML; MG/100ML; MG/100ML; MG/100ML
INJECTION, SOLUTION INTRAVENOUS CONTINUOUS PRN
Status: DISCONTINUED | OUTPATIENT
Start: 2024-05-03 | End: 2024-05-03

## 2024-05-03 RX ORDER — ONDANSETRON 2 MG/ML
4 INJECTION INTRAMUSCULAR; INTRAVENOUS EVERY 30 MIN PRN
Status: DISCONTINUED | OUTPATIENT
Start: 2024-05-03 | End: 2024-05-03 | Stop reason: HOSPADM

## 2024-05-03 RX ADMIN — ONDANSETRON 4 MG: 2 INJECTION INTRAMUSCULAR; INTRAVENOUS at 08:09

## 2024-05-03 RX ADMIN — ROCURONIUM BROMIDE 50 MG: 50 INJECTION, SOLUTION INTRAVENOUS at 07:39

## 2024-05-03 RX ADMIN — SUGAMMADEX 200 MG: 100 INJECTION, SOLUTION INTRAVENOUS at 09:15

## 2024-05-03 RX ADMIN — GABAPENTIN 300 MG: 300 CAPSULE ORAL at 06:24

## 2024-05-03 RX ADMIN — PROPOFOL 140 MCG/KG/MIN: 10 INJECTION, EMULSION INTRAVENOUS at 07:40

## 2024-05-03 RX ADMIN — PROPOFOL 200 MG: 10 INJECTION, EMULSION INTRAVENOUS at 07:38

## 2024-05-03 RX ADMIN — HYDROMORPHONE HYDROCHLORIDE 0.5 MG: 1 INJECTION, SOLUTION INTRAMUSCULAR; INTRAVENOUS; SUBCUTANEOUS at 09:30

## 2024-05-03 RX ADMIN — ROCURONIUM BROMIDE 30 MG: 50 INJECTION, SOLUTION INTRAVENOUS at 08:18

## 2024-05-03 RX ADMIN — SODIUM CHLORIDE, POTASSIUM CHLORIDE, SODIUM LACTATE AND CALCIUM CHLORIDE: 600; 310; 30; 20 INJECTION, SOLUTION INTRAVENOUS at 09:17

## 2024-05-03 RX ADMIN — PHENYLEPHRINE HYDROCHLORIDE 100 MCG: 10 INJECTION INTRAVENOUS at 08:45

## 2024-05-03 RX ADMIN — LIDOCAINE HYDROCHLORIDE 100 MG: 20 INJECTION, SOLUTION INFILTRATION; PERINEURAL at 07:38

## 2024-05-03 RX ADMIN — PHENYLEPHRINE HYDROCHLORIDE 100 MCG: 10 INJECTION INTRAVENOUS at 08:13

## 2024-05-03 RX ADMIN — DEXAMETHASONE SODIUM PHOSPHATE 4 MG: 4 INJECTION, SOLUTION INTRA-ARTICULAR; INTRALESIONAL; INTRAMUSCULAR; INTRAVENOUS; SOFT TISSUE at 08:09

## 2024-05-03 RX ADMIN — CLINDAMYCIN PHOSPHATE 900 MG: 900 INJECTION, SOLUTION INTRAVENOUS at 06:52

## 2024-05-03 RX ADMIN — HYDROMORPHONE HYDROCHLORIDE 0.5 MG: 1 INJECTION, SOLUTION INTRAMUSCULAR; INTRAVENOUS; SUBCUTANEOUS at 07:38

## 2024-05-03 RX ADMIN — SODIUM CHLORIDE, POTASSIUM CHLORIDE, SODIUM LACTATE AND CALCIUM CHLORIDE: 600; 310; 30; 20 INJECTION, SOLUTION INTRAVENOUS at 07:33

## 2024-05-03 ASSESSMENT — ACTIVITIES OF DAILY LIVING (ADL)
ADLS_ACUITY_SCORE: 35

## 2024-05-03 ASSESSMENT — ENCOUNTER SYMPTOMS
SEIZURES: 0
DYSRHYTHMIAS: 0

## 2024-05-03 ASSESSMENT — LIFESTYLE VARIABLES: TOBACCO_USE: 0

## 2024-05-03 NOTE — ANESTHESIA PREPROCEDURE EVALUATION
Anesthesia Pre-Procedure Evaluation    Patient: Ross Dumas   MRN: [id number] : 1990        Procedure : Procedure(s):  Lumbar 5 to Sacral 1 bilateral midline sparing laminectomy and discectomy          Past Medical History:   Diagnosis Date    Arthritis 2019    knees    PONV (postoperative nausea and vomiting)       Past Surgical History:   Procedure Laterality Date    ARTHROSCOPIC REPAIR ACL      X2 2005- Right, 2006- Left    HC KNEE SCOPE,AID ANT CRUCIATE REPAIR Left 2004    Ruptured ACL graft, hx of allograft ACL reconstruction with small 5 mm longitudinal tear, mid posterior aspect of lateral meniscus.    INJECT EPIDURAL LUMBAR N/A 2024    Procedure: Lumbar 5 - Sacral 1 interlaminar epidural steroid injection using fluoroscopic guidance with contrast dye.;  Surgeon: Jey Jung MD;  Location: [facility]    VASECTOMY        Allergies   Allergen Reactions    Penicillins      Thinks he had a reaction when he was a child but not sure of the reaction (just what his mom told him)    Seasonal Allergies       Social History     Tobacco Use    Smoking status: Never    Smokeless tobacco: Never   Substance Use Topics    Alcohol use: Yes     Comment: 1 per week      Wt Readings from Last 1 Encounters:   24 79.2 kg (174 lb 8 oz)        Anesthesia Evaluation   Pt has had prior anesthetic.     History of anesthetic complications (Issues after general anesthetics. No reported issues with pain medications and no h/o motion sickness.)  - PONV.      ROS/MED HX  ENT/Pulmonary:    (-) tobacco use, asthma and recent URI   Neurologic:    (-) no seizures, no CVA and no TIA   Cardiovascular:    (-) arrhythmias   METS/Exercise Tolerance: >4 METS    Hematologic:    (-) history of blood clots   Musculoskeletal:       GI/Hepatic:    (-) GERD and liver disease   Renal/Genitourinary:    (-) renal disease   Endo:    (-) Type II DM and obesity   Psychiatric/Substance Use:       Infectious Disease:    (-) Recent  "Fever   Malignancy:       Other:            Physical Exam    Airway        Mallampati: I   TM distance: > 3 FB   Neck ROM: full   Mouth opening: > 3 cm    Respiratory Devices and Support         Dental       (+) Completely normal teeth      Cardiovascular          Rhythm and rate: regular and normal     Pulmonary           breath sounds clear to auscultation           OUTSIDE LABS:  CBC:   Lab Results   Component Value Date    WBC 7.0 06/02/2023    WBC 8.5 10/19/2018    HGB 15.8 04/30/2024    HGB 14.3 06/02/2023    HCT 41.9 06/02/2023    HCT 45.7 10/19/2018     06/02/2023     10/19/2018     BMP:   Lab Results   Component Value Date     06/02/2023     10/19/2018    POTASSIUM 4.0 06/02/2023    POTASSIUM 3.8 10/19/2018    CHLORIDE 105 06/02/2023    CHLORIDE 104 10/19/2018    CO2 25 06/02/2023    CO2 26 10/19/2018    BUN 13.7 06/02/2023    BUN 12 10/19/2018    CR 0.96 06/02/2023    CR 1.12 10/19/2018    GLC 92 06/02/2023     (H) 10/19/2018     COAGS: No results found for: \"PTT\", \"INR\", \"FIBR\"  POC: No results found for: \"BGM\", \"HCG\", \"HCGS\"  HEPATIC:   Lab Results   Component Value Date    ALBUMIN 4.4 06/02/2023    PROTTOTAL 7.7 06/02/2023    ALT 21 06/02/2023    AST 28 06/02/2023    ALKPHOS 66 06/02/2023    BILITOTAL 0.5 06/02/2023     OTHER:   Lab Results   Component Value Date    A1C 5.1 05/30/2019    MIGUEL 9.5 06/02/2023    MAG 2.0 06/02/2023       Anesthesia Plan    ASA Status:  1    NPO Status:  NPO Appropriate    Anesthesia Type: General.     - Airway: ETT   Induction: Intravenous, Propofol.   Maintenance: TIVA.        Consents    Anesthesia Plan(s) and associated risks, benefits, and realistic alternatives discussed. Questions answered and patient/representative(s) expressed understanding.     - Discussed: Risks, Benefits and Alternatives for the PROCEDURE were discussed     - Discussed with:  Patient            Postoperative Care    Pain management: IV analgesics, Oral pain " "medications, Multi-modal analgesia.   PONV prophylaxis: Ondansetron (or other 5HT-3), Dexamethasone or Solumedrol     Comments:               Donya Haywood MD    I have reviewed the pertinent notes and labs in the chart from the past 30 days and (re)examined the patient.  Any updates or changes from those notes are reflected in this note.              # Overweight: Estimated body mass index is 26.53 kg/m  as calculated from the following:    Height as of this encounter: 1.727 m (5' 8\").    Weight as of this encounter: 79.2 kg (174 lb 8 oz).      "

## 2024-05-03 NOTE — CONFIDENTIAL NOTE
Hannah Alvarado PA-C reviewed and approved Zofran Rx. Called and updated patient's wife Rx sent to pharmacy. Asked for update on patient and she said he's doing a little better was finally able to eat a few crackers. Advised to ctm his nausea/symptoms and to update clinic with any further questions or concerns and to seek care if any urgent concerns arise. She verbalized understanding.

## 2024-05-03 NOTE — DISCHARGE INSTRUCTIONS
Same Day Surgery Discharge Instructions for  Sedation and General Anesthesia     It's not unusual to feel dizzy, light-headed or faint for up to 24 hours after surgery or while taking pain medication.  If you have these symptoms: sit for a few minutes before standing and have someone assist you when you get up to walk or use the bathroom.    You should rest and relax for the next 24 hours. We recommend you make arrangements to have an adult stay with you for at least 24 hours after your discharge.  Avoid hazardous and strenuous activity.    DO NOT DRIVE any vehicle or operate mechanical equipment for 24 hours following the end of your surgery.  Even though you may feel normal, your reactions may be affected by the medication you have received.    Do not drink alcoholic beverages for 24 hours following surgery.     Slowly progress to your regular diet as you feel able. It's not unusual to feel nauseated and/or vomit after receiving anesthesia.  If you develop these symptoms, drink clear liquids (apple juice, ginger ale, broth, 7-up, etc. ) until you feel better.  If your nausea and vomiting persists for 24 hours, please notify your surgeon.      All narcotic pain medications, along with inactivity and anesthesia, can cause constipation. Drinking plenty of liquids and increasing fiber intake will help.    For any questions of a medical nature, call your surgeon.    Do not make important decisions for 24 hours.    If you had general anesthesia, you may have a sore throat for a couple of days related to the breathing tube used during surgery.  You may use Cepacol lozenges to help with this discomfort.  If it worsens or if you develop a fever, contact your surgeon.     If you feel your pain is not well managed with the pain medications prescribed by your surgeon, please contact your surgeon's office to let them know so they can address your concerns.      **If you have questions or concerns about your procedure,    call Dr. Laurent at [phone]**

## 2024-05-03 NOTE — BRIEF OP NOTE
[facility]    Brief Operative Note    Pre-operative diagnosis: Spinal stenosis of lumbar region, unspecified whether neurogenic claudication present [M48.061]  Post-operative diagnosis Same as pre-operative diagnosis    Procedure: Lumbar 5 to Sacral 1 bilateral midline sparing laminectomy and discectomy, Bilateral - Spine    Surgeon: Surgeons and Role:     * Jeffrey Laurent MD - Primary     * Monika Jon NP - Assisting  Anesthesia: General   Estimated Blood Loss: 25ml     Drains: None  Specimens: * No specimens in log *  Findings: See op note

## 2024-05-03 NOTE — ANESTHESIA PROCEDURE NOTES
Airway       Patient location during procedure: OR       Procedure Start/Stop Times: 5/3/2024 7:42 AM  Staff -        CRNA: Pati Richardson APRN CRNA       Performed By: CRNA  Consent for Airway        Urgency: elective  Indications and Patient Condition       Indications for airway management: brittnee-procedural         Mask difficulty assessment: 1 - vent by mask    Final Airway Details       Final airway type: endotracheal airway       Successful airway: ETT - single  Endotracheal Airway Details        ETT size (mm): 7.0       Cuffed: yes       Successful intubation technique: video laryngoscopy       VL Blade Size: Tracy 4       Grade View of Cords: 1       Adjucts: stylet       Position: Center       Measured from: gums/teeth       Secured at (cm): 23       Bite block used: None    Post intubation assessment        Placement verified by: capnometry and equal breath sounds        Number of attempts at approach: 1       Ease of procedure: easy       Dentition: Intact and Unchanged    Medication(s) Administered   Medication Administration Time: 5/3/2024 7:42 AM

## 2024-05-03 NOTE — ANESTHESIA POSTPROCEDURE EVALUATION
Patient: Ross Dumas    Procedure: Procedure(s):  Lumbar 5 to Sacral 1 bilateral midline sparing laminectomy and discectomy       Anesthesia Type:  General    Note:  Disposition: Admission   Postop Pain Control: Uneventful            Sign Out: Well controlled pain   PONV: No   Neuro/Psych: Uneventful            Sign Out: Acceptable/Baseline neuro status   Airway/Respiratory: Uneventful            Sign Out: Acceptable/Baseline resp. status   CV/Hemodynamics: Uneventful            Sign Out: Acceptable CV status; No obvious hypovolemia; No obvious fluid overload   Other NRE: NONE   DID A NON-ROUTINE EVENT OCCUR? No           Last vitals:  Vitals Value Taken Time   /78 05/03/24 1015   Temp 36.1  C (97  F) 05/03/24 0934   Pulse 64 05/03/24 1019   Resp 11 05/03/24 1019   SpO2 100 % 05/03/24 1022   Vitals shown include unfiled device data.    Electronically Signed By: Anne Hoskins MD  May 3, 2024  12:06 PM

## 2024-05-03 NOTE — ANESTHESIA CARE TRANSFER NOTE
Patient: Ross Dumas    Procedure: Procedure(s):  Lumbar 5 to Sacral 1 bilateral midline sparing laminectomy and discectomy       Diagnosis: Spinal stenosis of lumbar region, unspecified whether neurogenic claudication present [M48.061]  Diagnosis Additional Information: No value filed.    Anesthesia Type:   General     Note:    Oropharynx: oropharynx clear of all foreign objects  Level of Consciousness: awake  Oxygen Supplementation: room air    Independent Airway: airway patency satisfactory and stable  Dentition: dentition unchanged  Vital Signs Stable: post-procedure vital signs reviewed and stable  Report to RN Given: handoff report given  Patient transferred to: PACU    Handoff Report: Identifed the Patient, Identified the Reponsible Provider, Reviewed the pertinent medical history, Discussed the surgical course, Reviewed Intra-OP anesthesia mangement and issues during anesthesia, Set expectations for post-procedure period and Allowed opportunity for questions and acknowledgement of understanding    Vitals:  Vitals Value Taken Time   /73 05/03/24 0934   Temp     Pulse 68 05/03/24 0936   Resp 7 05/03/24 0936   SpO2 95 % 05/03/24 0936   Vitals shown include unfiled device data.    Electronically Signed By: LENARD Echeverria CRNA  May 3, 2024  9:37 AM

## 2024-05-03 NOTE — TELEPHONE ENCOUNTER
Health Call Center    Phone Message    May a detailed message be left on voicemail: yes     Reason for Call: Medication Refill Request    Has the patient contacted the pharmacy for the refill? Yes     Name of medication being requested: Zofran    Provider who prescribed the medication: Jeffrey Laurent    Pharmacy: [facility] in [place]    Date medication is needed: ASAP    Pt's wife said that Pt can't keep anything down since surgery. She wants to know if Dr. Laurent can prescribe Zofran for his nausea.

## 2024-05-03 NOTE — CONFIDENTIAL NOTE
Last Visit: today surgery     Next Visit: 5/17/24 RN visit     Name of Provider:  Dr. Laurent     Assessment: Surgery today with Dr. Laurent for L5-S1 bilateral laminectomy and microdiscectomy for decompression of central stenosis.    Spoke to Wife Dipika (Consent on file). Said  has had mostly nausea with a HA and a few episodes of vomiting since he got home from the hospital. Wife is asking about Rx for zofran.     If approved, she wants Rx sent to [facility]    Recommendation given: Rx for zofran aurora'd up and routed to care team for review and approval     Action needed from provider: review and sign off on Rx for zofran

## 2024-05-06 ENCOUNTER — NURSE TRIAGE (OUTPATIENT)
Dept: NURSING | Facility: CLINIC | Age: 34
End: 2024-05-06
Payer: COMMERCIAL

## 2024-05-06 ENCOUNTER — DOCUMENTATION ONLY (OUTPATIENT)
Dept: NEUROLOGY | Facility: CLINIC | Age: 34
End: 2024-05-06
Payer: COMMERCIAL

## 2024-05-06 NOTE — TELEPHONE ENCOUNTER
Calling regarding incision, patient unaware of MyChart update regarding incision.  Patient will respond to MyChart inquiry from Monika Jon.  Patient verbalized understanding of care advice.  Celia Castillo RN on 5/6/2024 at 5:43 PM    Reason for Disposition    [1] Follow-up call to recent contact AND [2] information only call, no triage required    Protocols used: Information Only Call - No Triage-A-

## 2024-05-06 NOTE — PROGRESS NOTES
Patient sent a [employer] message today under the encounter from 4/24/2024. Created a new encounter today to reflect a more current/accurate date in the chart.  Replied to patient's [employer] message today.

## 2024-05-07 ASSESSMENT — ACTIVITIES OF DAILY LIVING (ADL)
ADLS_ACUITY_SCORE: 35

## 2024-05-10 ENCOUNTER — TELEPHONE (OUTPATIENT)
Dept: NEUROSURGERY | Facility: CLINIC | Age: 34
End: 2024-05-10
Payer: COMMERCIAL

## 2024-05-10 NOTE — TELEPHONE ENCOUNTER
Health Call Center    Phone Message    May a detailed message be left on voicemail: yes     Reason for Call: Pt has been having headaches since his surgery.  He also has a lump on the incision site.  He is requesting a call back from a nurse to discuss.  Thanks.

## 2024-05-10 NOTE — TELEPHONE ENCOUNTER
Per provider, lay flat over the weekend as much as possible, drink fluids and caffeine.   Continue to monitor incision and symptoms.   Plan for appointment in clinic monday with Dr. Laurent.   If he has ANY DRAINAGE from the incision site, or infectious symptoms he needs to go to the ED (preferably [facility]).   Dr Laurent aware     Appt made for 11:15    Instructions sent to [employer] for reference for over the weekend

## 2024-05-10 NOTE — TELEPHONE ENCOUNTER
Patient calls again today to report Continued headaches and lump on incision   See additional documentation and communication from 5/6-5/8 but it is IN the mychart encounter from 4/24/24    Called on call on Saturday am for pounding headaches  Patient was instructed to lay flat over the weekend. His headaches had started to improve by Monday so he was then instructed to gradually elevate head of bed and increase activity as tolerated.     Mon-Wed reports he continued to lay down the majority of the day  Wednesday started walking more- inside and outside  Thursday was laying flat most of the day     DOS: 5/3/24  Surgery: Lumbar 5 to Sacral 1 bilateral midline sparing laminectomy and discectomy   Surgeon: Dr Laurent     Assessment:   Headache worsened again yesterday (Thursday)  Last night, his headache was relieved and the swelling near his incision doubled in size  Today the headaches are starting to mildly return   Denies any other new or worsening sx  No drainage from incision or other sign of infection   Reports incision is not painful. Only pain he has is in his hips/back when he has to lay for an extended amount of time to relieve his headache.   He has maxed out on Caffeine   Unable to attach pics into mychart. Incision pictures emailed and added into chart    Recommendation given: review with providers    Action needed from provider: al

## 2024-05-13 ENCOUNTER — OFFICE VISIT (OUTPATIENT)
Dept: NEUROSURGERY | Facility: CLINIC | Age: 34
End: 2024-05-13
Payer: COMMERCIAL

## 2024-05-13 VITALS — HEART RATE: 68 BPM | SYSTOLIC BLOOD PRESSURE: 117 MMHG | OXYGEN SATURATION: 99 % | DIASTOLIC BLOOD PRESSURE: 79 MMHG

## 2024-05-13 DIAGNOSIS — Z98.890 S/P LUMBAR LAMINECTOMY: Primary | ICD-10-CM

## 2024-05-13 PROCEDURE — 99024 POSTOP FOLLOW-UP VISIT: CPT | Performed by: NEUROLOGICAL SURGERY

## 2024-05-13 ASSESSMENT — PAIN SCALES - GENERAL: PAINLEVEL: MODERATE PAIN (4)

## 2024-05-13 NOTE — NURSING NOTE
"Ross Dumas is a 34 year old male who presents for:  Chief Complaint   Patient presents with    RECHECK     F/U -- headaches and incision issue,         Initial Vitals:  /79   Pulse 68   SpO2 99%  Estimated body mass index is 26.53 kg/m  as calculated from the following:    Height as of 5/3/24: 5' 8\" (1.727 m).    Weight as of 5/3/24: 174 lb 8 oz (79.2 kg).. There is no height or weight on file to calculate BSA. BP completed using cuff size: regular  Moderate Pain (4)    Nursing Comments:       Berna Mohamud    "

## 2024-05-13 NOTE — PROGRESS NOTES
10 days post-op from L5-S1 bilateral laminotomies and discectomy.  In the first couple days post-op, he developed positional headaches, which improved with rest.  On Thursday, he developed a subcutaneous fluid collection, and had severe worsening of headaches that day.  Fluid collection improved over the weekend, and headaches lessened as well.  No incisional drainage.       Past Medical History:   Diagnosis Date    Arthritis 2019    knees    PONV (postoperative nausea and vomiting)      Past Surgical History:   Procedure Laterality Date    ARTHROSCOPIC REPAIR ACL      X2 2005- Right, 2006- Left    HC KNEE SCOPE,AID ANT CRUCIATE REPAIR Left 11/23/2004    Ruptured ACL graft, hx of allograft ACL reconstruction with small 5 mm longitudinal tear, mid posterior aspect of lateral meniscus.    INJECT EPIDURAL LUMBAR N/A 04/19/2024    Procedure: Lumbar 5 - Sacral 1 interlaminar epidural steroid injection using fluoroscopic guidance with contrast dye.;  Surgeon: Jye Jung MD;  Location: [facility] OR    LAMINECTOMY LUMBAR ONE LEVEL Bilateral 5/3/2024    Procedure: Lumbar 5 to Sacral 1 bilateral midline sparing laminectomy and discectomy;  Surgeon: Jeffrey Laurent MD;  Location: [facility] OR    VASECTOMY  2020     Social History     Socioeconomic History    Marital status:      Spouse name: Not on file    Number of children: Not on file    Years of education: Not on file    Highest education level: Not on file   Occupational History    Not on file   Tobacco Use    Smoking status: Never    Smokeless tobacco: Never   Vaping Use    Vaping status: Never Used   Substance and Sexual Activity    Alcohol use: Yes     Comment: 1 per week    Drug use: No    Sexual activity: Yes     Partners: Female     Birth control/protection: Male Surgical, None   Other Topics Concern    Parent/sibling w/ CABG, MI or angioplasty before 65F 55M? No   Social History Narrative    Not on file     Social Determinants of Health     Financial Resource  Strain: Not on file   Food Insecurity: Not on file   Transportation Needs: Not on file   Physical Activity: Not on file   Stress: Not on file   Social Connections: Not on file   Interpersonal Safety: Not on file   Housing Stability: Not on file     No family history on file.     ROS: 10 point ROS neg other than the symptoms noted above in the HPI.    Physical Exam  /79   Pulse 68   SpO2 99%   HEENT:  Normocephalic, atraumatic.  PERRLA.  EOM s intact.  Visual fields full to gross exam  Neck:  Supple, non-tender, without lymphadenopathy.  Heart:  No peripheral edema  Lungs:  No SOB  Abdomen:  Non-distended.   Skin:  Warm and dry.  Extremities:  No edema, cyanosis or clubbing.  Psychiatric:  No apparent distress  Musculoskeletal:  Normal bulk and tone    NEUROLOGICAL EXAMINATION:     Mental status:  Alert and Oriented x 3, speech is fluent.  Cranial nerves:  II-XII intact.   Motor:    Shoulder Abduction:  Right:  5/5   Left:  5/5  Biceps:                      Right:  5/5   Left:  5/5  Triceps:                     Right:  5/5   Left:  5/5  Wrist Extensors:       Right:  5/5   Left:  5/5  Wrist Flexors:           Right:  5/5   Left:  5/5  interosseus :            Right:  5/5   Left:  5/5  Hip Flexor:                Right: 5/5  Left:  5/5  Quadriceps:             Right:  5/5  Left:  5/5  Hamstrings:             Right:  5/5  Left:  5/5  Gastroc Soleus:        Right:  5/5  Left:  5/5  Tib/Ant:                      Right:  5/5  Left:  5/5  EHL:                     Right:  5/5  Left:  5/5  Sensation:  Intact  Reflexes:  Negative Babinski.  Negative Clonus.  Negative Colbert's.  Coordination:  Smooth finger to nose testing.   Negative pronator drift.  Smooth tandem walking.  Incision closed with steri-strips in place, no drainage, swelling, or erythema    A/P:  Improved fluid collection and improvement in headaches  Will continue to monitor closely

## 2024-05-13 NOTE — LETTER
5/13/2024         RE: Ross Dumas  [id number] [facility] [address]        Dear Colleague,    Thank you for referring your patient, Ross Dumas, to the [facility]. Please see a copy of my visit note below.    10 days post-op from L5-S1 bilateral laminotomies and discectomy.  In the first couple days post-op, he developed positional headaches, which improved with rest.  On Thursday, he developed a subcutaneous fluid collection, and had severe worsening of headaches that day.  Fluid collection improved over the weekend, and headaches lessened as well.  No incisional drainage.       Past Medical History:   Diagnosis Date     Arthritis 2019    knees     PONV (postoperative nausea and vomiting)      Past Surgical History:   Procedure Laterality Date     ARTHROSCOPIC REPAIR ACL      X2 2005- Right, 2006- Left     HC KNEE SCOPE,AID ANT CRUCIATE REPAIR Left 11/23/2004    Ruptured ACL graft, hx of allograft ACL reconstruction with small 5 mm longitudinal tear, mid posterior aspect of lateral meniscus.     INJECT EPIDURAL LUMBAR N/A 04/19/2024    Procedure: Lumbar 5 - Sacral 1 interlaminar epidural steroid injection using fluoroscopic guidance with contrast dye.;  Surgeon: Jey Jung MD;  Location: [facility] OR     LAMINECTOMY LUMBAR ONE LEVEL Bilateral 5/3/2024    Procedure: Lumbar 5 to Sacral 1 bilateral midline sparing laminectomy and discectomy;  Surgeon: Jeffrey Laurent MD;  Location: [facility] OR     VASECTOMY  2020     Social History     Socioeconomic History     Marital status:      Spouse name: Not on file     Number of children: Not on file     Years of education: Not on file     Highest education level: Not on file   Occupational History     Not on file   Tobacco Use     Smoking status: Never     Smokeless tobacco: Never   Vaping Use     Vaping status: Never Used   Substance and Sexual Activity     Alcohol use: Yes     Comment: 1 per week     Drug use: No     Sexual  activity: Yes     Partners: Female     Birth control/protection: Male Surgical, None   Other Topics Concern     Parent/sibling w/ CABG, MI or angioplasty before 65F 55M? No   Social History Narrative     Not on file     Social Determinants of Health     Financial Resource Strain: Not on file   Food Insecurity: Not on file   Transportation Needs: Not on file   Physical Activity: Not on file   Stress: Not on file   Social Connections: Not on file   Interpersonal Safety: Not on file   Housing Stability: Not on file     No family history on file.     ROS: 10 point ROS neg other than the symptoms noted above in the HPI.    Physical Exam  /79   Pulse 68   SpO2 99%   HEENT:  Normocephalic, atraumatic.  PERRLA.  EOM s intact.  Visual fields full to gross exam  Neck:  Supple, non-tender, without lymphadenopathy.  Heart:  No peripheral edema  Lungs:  No SOB  Abdomen:  Non-distended.   Skin:  Warm and dry.  Extremities:  No edema, cyanosis or clubbing.  Psychiatric:  No apparent distress  Musculoskeletal:  Normal bulk and tone    NEUROLOGICAL EXAMINATION:     Mental status:  Alert and Oriented x 3, speech is fluent.  Cranial nerves:  II-XII intact.   Motor:    Shoulder Abduction:  Right:  5/5   Left:  5/5  Biceps:                      Right:  5/5   Left:  5/5  Triceps:                     Right:  5/5   Left:  5/5  Wrist Extensors:       Right:  5/5   Left:  5/5  Wrist Flexors:           Right:  5/5   Left:  5/5  interosseus :            Right:  5/5   Left:  5/5  Hip Flexor:                Right: 5/5  Left:  5/5  Quadriceps:             Right:  5/5  Left:  5/5  Hamstrings:             Right:  5/5  Left:  5/5  Gastroc Soleus:        Right:  5/5  Left:  5/5  Tib/Ant:                      Right:  5/5  Left:  5/5  EHL:                     Right:  5/5  Left:  5/5  Sensation:  Intact  Reflexes:  Negative Babinski.  Negative Clonus.  Negative Colbert's.  Coordination:  Smooth finger to nose testing.   Negative pronator drift.   Smooth tandem walking.  Incision closed with steri-strips in place, no drainage, swelling, or erythema    A/P:  Improved fluid collection and improvement in headaches  Will continue to monitor closely       Again, thank you for allowing me to participate in the care of your patient.        Sincerely,        Jeffrey Laurent MD

## 2024-05-16 NOTE — PROGRESS NOTES
Post-op Nurse Visit:    Patient seen today per the order of  Jeffrey Laurent MD .   DOS: 5/3/24  Procedure: L5-S1 bilateral midline sparing laminectomy and discectomy    Pain/Neuro Assessment  0/10.1-2/10 to low back intermittently after sitting for long time. Feels sore.  Numbness/tingling: none   Strength: Equal strength to bilateral lower extremities. Denies weakness.     Had bulge on incision and was sitting in the care Sunday when he felt it reduce. Positional headaches greatly improved. Saw Dr. Laurent Monday.    Pain Relief Measures:  Ice: minimal     Incision   Incision inspected. Steri-strips present. Writer removed. Small open point at most superior  portion of incision. Surrounding skin more red. Swelling present but decreased when comparing previous photo and confirmed swelling reduction with patient report. No drainage or warmth noted. Shallow opening only 0.1 cm depth. Writer irrigated incision with sterile saline, dabbed dry with sterile gauze, and applied 3 new steri-strips to hold open area shut.  As for the rest of the incision, edges well-approximated. No redness, swelling, drainage, or warmth noted. I scheduled him for an incision check next Thursday (1 week follow-up) Will review with [facility] team.    Activity  Following restrictions   Falls:  none  Patient is walking frequently without difficulty.   Denies redness, swelling, or warmth to bilateral calves.     GI/  Patient's appetite is normal  Bowel/bladder problems? No  Taking stool softeners? No     Refills/x-rays/return to work  Refills given at this appointment? No  Return to work discussed at this appointment? Yes . Has been working from home. Will return to the office next week.    Monika Jon CNP reviewed incision concerns and agrees with plan for incision check next week and ordering Bactrim antibiotic for possible start of and infection. Orders placed. Apt scheduled. Patient updated.    All of patient's questions addressed  today. Patient was instructed to call with any additional questions/concerns.     Viki Lau RN on 5/17/2024 at 10:17 AM

## 2024-05-17 ENCOUNTER — ALLIED HEALTH/NURSE VISIT (OUTPATIENT)
Dept: NEUROSURGERY | Facility: CLINIC | Age: 34
End: 2024-05-17
Payer: COMMERCIAL

## 2024-05-17 VITALS — TEMPERATURE: 97 F

## 2024-05-17 DIAGNOSIS — Z98.890 S/P LUMBAR LAMINECTOMY: Primary | ICD-10-CM

## 2024-05-17 DIAGNOSIS — Z98.890 S/P LUMBAR DISCECTOMY: ICD-10-CM

## 2024-05-17 PROCEDURE — 99207 PR NO CHARGE NURSE ONLY: CPT

## 2024-05-17 RX ORDER — SULFAMETHOXAZOLE/TRIMETHOPRIM 800-160 MG
1 TABLET ORAL 2 TIMES DAILY
Qty: 14 TABLET | Refills: 0 | Status: SHIPPED | OUTPATIENT
Start: 2024-05-17 | End: 2024-05-24

## 2024-05-17 ASSESSMENT — PAIN SCALES - GENERAL: PAINLEVEL: NO PAIN (0)

## 2024-05-17 NOTE — PATIENT INSTRUCTIONS
Instructions for Patient    Incision  Steri-strips were applied today. They will fall off within 7-10 days. Do not pull them off.  Keep your incision clean and dry at all times.   It is okay to shower, just pat the incision dry   No submerging incision in water such as pools, hot tubs, or baths for at least 8 weeks and until the incision is healed  Do not apply lotions or ointments to incision    Activity  No lifting greater than 10 pounds. Limited bending, twisting, or overhead reaching.  Walking is the best way to start exercise after surgery. Take short frequent walks. You may gradually increase the distance as tolerated. If you feel pain, decrease your activity, but DO NOT stop walking. Walking will help you gain strength, prevent muscle soreness and spasms, and prevent blood clots.   Avoid bed rest and prolonged sitting for longer than 30 minutes (change positions frequently while awake)  No contact sports or high impact activities such as; running/jogging, snowmobile or 4 reaves riding or any other recreational vehicles until after given clearance at one of your follow up visits    Medications   Refills of pain medication:   Please call the neurosurgery clinic to request 2-3 days before you run out. A nurse will call you back to obtain a pain assessment.   Weaning from narcotic pain medications  When it is time, start weaning by extending the time between doses.   For example, if you're taking 2 tabs every 4 hours, spread it out to 2 tabs over 4.5, 5, 6 hours. At that point you can certainly cut down to 1 tab, then wean to an as needed basis until completely done with them.  Don't take more than 3000mg of Acetaminophen in 24 hours  Ok to begin taking Aspirin and NSAIDs (ex: ibuprofen/Advil)  Encouraged icing for at least 3-4 times throughout the day for 20-30 minutes at a time. Avoid heat to the incision area.   Taking stool softeners regularly can reduce constipation commonly caused by narcotic pain  medications.    Contact clinic or Emergency Room if you develop:   Infection (redness, swelling, warmth, drainage, fever over 101 F)  New injury  Bladder or bowel changes or loss of control    Signs of blood clot:  Swelling and/or warmth in one or both legs  Pain or tenderness in your leg, ankle, foot, or arm   Red or discolored skin     Go to the Emergency Room   If sudden onset of severe headache, weakness, confusion, change in level of consciousness, pain, or loss of movement.  Chest pain  Trouble breathing     Post-operative appointments  1 week from now for an incision check.  6 week and 3 months post-op    [facility]  [facility]  [address]  [place] [address]  Telephone:  [phone]  Fax:  [phone]

## 2024-05-17 NOTE — Clinical Note
Saw for 2 week post-op. See my note for incision concern. Your advise is needed. Today's action: I scheduled him for incision check next Thursday (in 1 week). I don't feel it's infected but could be soon or very early in infection due to open wound, redness, and swelling. Do you feel abx would be appropriate as more of a precautionary measure? Or should we see how he's doing at next visit?  ThanksEl

## 2024-05-21 ENCOUNTER — MYC MEDICAL ADVICE (OUTPATIENT)
Dept: NEUROSURGERY | Facility: CLINIC | Age: 34
End: 2024-05-21
Payer: COMMERCIAL

## 2024-05-21 DIAGNOSIS — Z98.890 S/P LUMBAR DISCECTOMY: Primary | ICD-10-CM

## 2024-05-21 RX ORDER — METHYLPREDNISOLONE 4 MG
TABLET, DOSE PACK ORAL
Qty: 21 TABLET | Refills: 0 | Status: SHIPPED | OUTPATIENT
Start: 2024-05-21 | End: 2024-08-01

## 2024-05-21 NOTE — TELEPHONE ENCOUNTER
Per LARRY ok to try MDP and provider will touch base with RN who sees pt on Thursday.    Video [employer] message sent to patient to update. Rx sent to pharmacy.

## 2024-05-21 NOTE — TELEPHONE ENCOUNTER
Patient is 2 weeks 4 days s/p lumbar 5 - sacral 1 bilateral midline sparing laminectomy and discectomy with Dr. Laurent on 5/3/24.    Patient is scheduled for follow-up with RN on 5/23.    Patient sent [employer] message reporting he slipped on Saturday and avoided a fall by grabbing onto something. Patient reports intermittent numbness and tinging in his hips and legs following the incident. Patient worried he may have reherniated a disc. Symptoms unchanged since Saturday. Symptoms not as intense as before surgery.    Called patient to further review symptoms. Patient reports he slipped on water but was able to catch himself before falling. After surgery n/t was improved. After this slip, pre-op symptom of intermittent numbness and tingling in the hips and thighs has returned though not as severe as prior to surgery.     Advised an update will be sent to provider team for review.

## 2024-05-23 ENCOUNTER — ALLIED HEALTH/NURSE VISIT (OUTPATIENT)
Dept: NEUROSURGERY | Facility: CLINIC | Age: 34
End: 2024-05-23
Payer: COMMERCIAL

## 2024-05-23 VITALS — TEMPERATURE: 97.8 F

## 2024-05-23 DIAGNOSIS — Z98.890 S/P DISCECTOMY: ICD-10-CM

## 2024-05-23 DIAGNOSIS — Z98.890 S/P LUMBAR LAMINECTOMY: ICD-10-CM

## 2024-05-23 DIAGNOSIS — Z98.890 S/P LUMBAR DISCECTOMY: Primary | ICD-10-CM

## 2024-05-23 PROCEDURE — 99207 PR NO CHARGE NURSE ONLY: CPT

## 2024-05-23 RX ORDER — METHOCARBAMOL 750 MG/1
750 TABLET, FILM COATED ORAL 3 TIMES DAILY PRN
Qty: 30 TABLET | Refills: 0 | Status: SHIPPED | OUTPATIENT
Start: 2024-05-23 | End: 2024-08-01

## 2024-05-23 NOTE — PROGRESS NOTES
Post-op Nurse Visit:     Patient seen today per the order of  Jeffrey Laurent MD .   DOS: 5/3/24  Procedure: L5-S1 bilateral midline sparing laminectomy and discectomy     Denies positional headaches.     Follow up from injury 5/21/24.  MEDROL dosepak started 5/21/24. Symptoms improved minimally. Mostly constant n/t left upper thigh. Yesterday was having n/t in right upper thigh. Intermittent n/t to leg above bilateral knees. Rates pain 3/10 when pain flares up to low to mid-back described as soreness and muscule tightness.  Taking robaxin 2-3 x per day for pain. Encouraged patient to start NSAIDS too.    Incision  Incision inspected. Edges well-approximated. No redness, swelling, drainage or warmth noted. Advised to complete the Bactrim as ordered. Will complete final dose Saturday.        Advised patient to call our team if incision concerns or if develops new/worsening pain/symptoms or if medrol dosepak does not help. Patient verbalizes understanding and will call back if needed.    Viki Lau RN on 5/23/2024 at 9:03 AM

## 2024-06-13 ENCOUNTER — OFFICE VISIT (OUTPATIENT)
Dept: NEUROSURGERY | Facility: CLINIC | Age: 34
End: 2024-06-13
Payer: COMMERCIAL

## 2024-06-13 VITALS
TEMPERATURE: 98 F | SYSTOLIC BLOOD PRESSURE: 100 MMHG | HEIGHT: 68 IN | WEIGHT: 174 LBS | BODY MASS INDEX: 26.37 KG/M2 | DIASTOLIC BLOOD PRESSURE: 78 MMHG

## 2024-06-13 DIAGNOSIS — Z98.890 S/P LUMBAR DISCECTOMY: Primary | ICD-10-CM

## 2024-06-13 PROCEDURE — 99024 POSTOP FOLLOW-UP VISIT: CPT | Performed by: NURSE PRACTITIONER

## 2024-06-13 ASSESSMENT — PAIN SCALES - GENERAL: PAINLEVEL: NO PAIN (0)

## 2024-06-13 NOTE — PATIENT INSTRUCTIONS
-May continue to increase activities as tolerated.   -Follow up in 6 weeks as scheduled.   -Please contact our clinic with questions or concerns at [phone].

## 2024-06-13 NOTE — PROGRESS NOTES
"Ross Dumsa is a 34 year old male who presents for:  Chief Complaint   Patient presents with    Neurologic Problem        Initial Vitals:  /78 (BP Location: Right arm, Cuff Size: Adult Regular)   Temp 98  F (36.7  C) (Temporal)   Ht 5' 8\" (1.727 m)   Wt 174 lb (78.9 kg)   BMI 26.46 kg/m   Estimated body mass index is 26.46 kg/m  as calculated from the following:    Height as of this encounter: 5' 8\" (1.727 m).    Weight as of this encounter: 174 lb (78.9 kg).. Body surface area is 1.95 meters squared. BP completed using cuff size: regular  No Pain (0)    Nursing Comments:     Malika Leyva CMA    "

## 2024-06-13 NOTE — LETTER
"6/13/2024      Ross Dumas  [address] [facility] [address]      Dear Colleague,    Thank you for referring your patient, Ross Dumas, to the [facility]OS[facility] [place]. Please see a copy of my visit note below.    Ross Dumas is a 34 year old male who presents for:  Chief Complaint   Patient presents with     Neurologic Problem        Initial Vitals:  /78 (BP Location: Right arm, Cuff Size: Adult Regular)   Temp 98  F (36.7  C) (Temporal)   Ht 5' 8\" (1.727 m)   Wt 174 lb (78.9 kg)   BMI 26.46 kg/m   Estimated body mass index is 26.46 kg/m  as calculated from the following:    Height as of this encounter: 5' 8\" (1.727 m).    Weight as of this encounter: 174 lb (78.9 kg).. Body surface area is 1.95 meters squared. BP completed using cuff size: regular  No Pain (0)    Nursing Comments:     Malika Leyva, [facility]  Neurosurgery Follow Up:    HPI: 6 weeks s/p L5-S1 bilateral midline sparing laminectomy and discectomy with Dr. Laurent on 5/3/2024. Doing well. Resolution of preoperative leg symptoms. Has intermittent  back pain with prolonged activity/inactivity. No overt weakness, however feels weaker due to inactivity. No incisional concerns.     Medical, surgical, family, and social history unchanged since prior exam.  Exam:  Constitutional:  Alert, well nourished, NAD.  HEENT: Normocephalic, atraumatic.   Pulm:  Without shortness of breath   CV:  No pitting edema of BLE.      Vital Signs:  /78 (BP Location: Right arm, Cuff Size: Adult Regular)   Temp 98  F (36.7  C) (Temporal)   Ht 5' 8\" (1.727 m)   Wt 174 lb (78.9 kg)   BMI 26.46 kg/m      Neurological:  Awake  Alert  Oriented x 3  Motor exam:        IP Q DF PF EHL  R   5  5   5   5    5  L   5  5   5   5    5     Able to spontaneously move L/E bilaterally  Sensation intact throughout all L/E dermatomes     Incisions:  Healing nicely  Imaging: No new imaging to review    A/P: s/p " lumbar laminectomy/discectomy  May increase activities as tolerated. Follow up in 6 weeks as scheduled. He verbalized understanding and agreement.   Patient Instructions   -May continue to increase activities as tolerated.   -Follow up in 6 weeks as scheduled.   -Please contact our clinic with questions or concerns at [phone].    Haley Lay CNP  [facility]  [address] [place] [address]  Tel [phone]  Fax [phone]      Again, thank you for allowing me to participate in the care of your patient.        Sincerely,        Haley Lay, NP

## 2024-06-13 NOTE — PROGRESS NOTES
"[facility]  Neurosurgery Follow Up:    HPI: 6 weeks s/p L5-S1 bilateral midline sparing laminectomy and discectomy with Dr. Laurent on 5/3/2024. Doing well. Resolution of preoperative leg symptoms. Has intermittent  back pain with prolonged activity/inactivity. No overt weakness, however feels weaker due to inactivity. No incisional concerns.     Medical, surgical, family, and social history unchanged since prior exam.  Exam:  Constitutional:  Alert, well nourished, NAD.  HEENT: Normocephalic, atraumatic.   Pulm:  Without shortness of breath   CV:  No pitting edema of BLE.      Vital Signs:  /78 (BP Location: Right arm, Cuff Size: Adult Regular)   Temp 98  F (36.7  C) (Temporal)   Ht 5' 8\" (1.727 m)   Wt 174 lb (78.9 kg)   BMI 26.46 kg/m      Neurological:  Awake  Alert  Oriented x 3  Motor exam:        IP Q DF PF EHL  R   5  5   5   5    5  L   5  5   5   5    5     Able to spontaneously move L/E bilaterally  Sensation intact throughout all L/E dermatomes     Incisions:  Healing nicely  Imaging: No new imaging to review    A/P: s/p lumbar laminectomy/discectomy  May increase activities as tolerated. Follow up in 6 weeks as scheduled. He verbalized understanding and agreement.   Patient Instructions   -May continue to increase activities as tolerated.   -Follow up in 6 weeks as scheduled.   -Please contact our clinic with questions or concerns at [phone].    Haley Lay, ADE  [facility]surgery  [address] [place] [address]  Tel [phone]  Fax [phone]    "

## 2024-06-17 NOTE — OP NOTE
Date of surgery: May 3, 2024  Surgeon: Jeffrey Laurent MD  Assistant: Monika Jon NP  (Note: The assistant was present for and assisted with the entire surgery, and his/her role as an assistant was crucial for aid in positioning, exposure, suctioning, retraction, and closure)    Preoperative diagnosis: Lumbar stenosis  Postoperative diagnosis: Lumbar stenosis    Procedure:  1.  L5-S1 bilateral laminectomy and microdiscectomy for decompression of central stenosis  2.  Use of intraoperative microscope and fluoroscopy    EBL: 25 mL    Indications: 34 year old male who presented with severe back and leg pain.  MRI showed severe stenosis at L5-S1 with large central disc herniation.  Underwent non-operative management with failure to improve.  Risks, benefits, indications, and alternatives were discussed with the patient and family in detail.  All of their questions were answered, and they wished to proceed.    Description of surgery: The patient was positioned prone.  Sterile prepping and draping procedures were performed.  Antibiotics were administered and timeout was performed.  A midline lumbar incision was performed.  The monopolar was used to expose the spinous processes, lamina, and medial facets at L5-S1.  Fluoroscopy was used to verify the correct level.  The microscope was brought into the field, and under microscopy, the high speed drill was then used to perform bilateral laminectomies and medial facetectomies with preservation of the midline tension band.  The Kerrison rongeurs were then used to remove the Ligamentum flavum, with decompression of the thecal sac and nerve roots.  Extruded disc herniation was removed bilaterally with the pituitary rongeurs.  Antibiotic irrigation was performed and hemostasis achieved.  The fascia was closed with 0-Vicryl sutures, and the dermal layer was closed with 2-0 vicryl sutures.  There were no intraprocedural complications.   
complains of pain/discomfort

## 2024-08-01 ENCOUNTER — OFFICE VISIT (OUTPATIENT)
Dept: NEUROSURGERY | Facility: CLINIC | Age: 34
End: 2024-08-01
Payer: COMMERCIAL

## 2024-08-01 VITALS
OXYGEN SATURATION: 100 % | HEART RATE: 104 BPM | RESPIRATION RATE: 14 BRPM | WEIGHT: 182 LBS | SYSTOLIC BLOOD PRESSURE: 100 MMHG | BODY MASS INDEX: 27.67 KG/M2 | DIASTOLIC BLOOD PRESSURE: 64 MMHG | TEMPERATURE: 97.6 F

## 2024-08-01 DIAGNOSIS — Z98.890 S/P LUMBAR DISCECTOMY: Primary | ICD-10-CM

## 2024-08-01 PROCEDURE — 99024 POSTOP FOLLOW-UP VISIT: CPT | Performed by: NURSE PRACTITIONER

## 2024-08-01 ASSESSMENT — PAIN SCALES - GENERAL: PAINLEVEL: NO PAIN (0)

## 2024-08-01 NOTE — NURSING NOTE
"Ross Dumas is a 34 year old male who presents for:  Chief Complaint   Patient presents with    Neurologic Problem     Lumbar 5 to Sacral 1 bilateral midline sparing laminectomy and discectomy 5/3/2024        Initial Vitals:  /64   Pulse 104   Temp 97.6  F (36.4  C) (Temporal)   Resp 14   Wt 182 lb (82.6 kg)   SpO2 100%   BMI 27.67 kg/m   Estimated body mass index is 27.67 kg/m  as calculated from the following:    Height as of 6/13/24: 5' 8\" (1.727 m).    Weight as of this encounter: 182 lb (82.6 kg).. Body surface area is 1.99 meters squared. BP completed using cuff size: regular  No Pain (0)    Nursing Comments:     Manuela Ellis    "

## 2024-08-01 NOTE — PATIENT INSTRUCTIONS
-Continue to increase activities as tolerated.   -Follow up as needed.   -Please contact our clinic with questions or concerns at [phone].

## 2024-08-01 NOTE — LETTER
8/1/2024      Ross Dumas  [address] [facility] [address]      Dear Colleague,    Thank you for referring your patient, Ross Dumas, to the [facility]UROSURGERY CLINIC [place]. Please see a copy of my visit note below.    [facility] Neurosurgery  Neurosurgery Follow Up:    HPI: 3 months s/p L5-S1 bilateral midline sparing laminectomy and discectomy with Dr. Laurent on 5/3/2024.  Doing well. Resolution of preoperative symptoms. Has some intermittent sore back pain. No paresthesias or overt weakness. No incisional complaints.     Medical, surgical, family, and social history unchanged since prior exam.  Exam:  Constitutional:  Alert, well nourished, NAD.  HEENT: Normocephalic, atraumatic.   Pulm:  Without shortness of breath   CV:  No pitting edema of BLE.      Vital Signs:  /64   Pulse 104   Temp 97.6  F (36.4  C) (Temporal)   Resp 14   Wt 182 lb (82.6 kg)   SpO2 100%   BMI 27.67 kg/m      Neurological:  Awake  Alert  Oriented x 3  Motor exam:        IP Q DF PF EHL  R   5  5   5   5    5  L   5  5   5   5    5     Able to spontaneously move L/E bilaterally  Sensation intact throughout all L/E dermatomes     Incisions:  No concerns.  Imaging: No imaging to review.    A/P: s/p lumbar laminectomy/discectomy  Doing well. May continue to increase activities as tolerated. Follow up as needed. He verbalized understanding and agreement.   Patient Instructions   -Continue to increase activities as tolerated.   -Follow up as needed.   -Please contact our clinic with questions or concerns at [phone].    Haley Lay CNP  [facility]surgery  [address] [place] [address]  Tel [phone]  Fax [phone]      Again, thank you for allowing me to participate in the care of your patient.        Sincerely,        Haley Lay NP

## 2024-08-01 NOTE — PROGRESS NOTES
[facility]surgery  Neurosurgery Follow Up:    HPI: 3 months s/p L5-S1 bilateral midline sparing laminectomy and discectomy with Dr. Laurent on 5/3/2024.  Doing well. Resolution of preoperative symptoms. Has some intermittent sore back pain. No paresthesias or overt weakness. No incisional complaints.     Medical, surgical, family, and social history unchanged since prior exam.  Exam:  Constitutional:  Alert, well nourished, NAD.  HEENT: Normocephalic, atraumatic.   Pulm:  Without shortness of breath   CV:  No pitting edema of BLE.      Vital Signs:  /64   Pulse 104   Temp 97.6  F (36.4  C) (Temporal)   Resp 14   Wt 182 lb (82.6 kg)   SpO2 100%   BMI 27.67 kg/m      Neurological:  Awake  Alert  Oriented x 3  Motor exam:        IP Q DF PF EHL  R   5  5   5   5    5  L   5  5   5   5    5     Able to spontaneously move L/E bilaterally  Sensation intact throughout all L/E dermatomes     Incisions:  No concerns.  Imaging: No imaging to review.    A/P: s/p lumbar laminectomy/discectomy  Doing well. May continue to increase activities as tolerated. Follow up as needed. He verbalized understanding and agreement.   Patient Instructions   -Continue to increase activities as tolerated.   -Follow up as needed.   -Please contact our clinic with questions or concerns at [phone].    Haley Lay CNP  [facility]  [address] [place] [address]  Tel [phone]  Fax [phone]

## 2024-08-31 ENCOUNTER — HEALTH MAINTENANCE LETTER (OUTPATIENT)
Age: 34
End: 2024-08-31

## (undated) DEVICE — SU VICRYL 2-0 CT-2 CR 8X18" J726D

## (undated) DEVICE — NDL SPINAL 18GA 3.5" 405184

## (undated) DEVICE — GLOVE BIOGEL PI ULTRATOUCH G SZ 7.5 42175

## (undated) DEVICE — TUBING SUCTION SOFT 20'X3/16" 0036570

## (undated) DEVICE — GLOVE BIOGEL PI MICRO SZ 7.5 48575

## (undated) DEVICE — ESU PENCIL W/HOLSTER E2350H

## (undated) DEVICE — SYR EAR BULB 3OZ 0035830

## (undated) DEVICE — SU MONOCRYL 4-0 PS-2 18" UND Y496G

## (undated) DEVICE — ESU ELEC BLADE 2.75" COATED/INSULATED E1455

## (undated) DEVICE — LINEN TOWEL PACK X5 5464

## (undated) DEVICE — SU VICRYL 0 CT-2 CR 8X18" J727D

## (undated) DEVICE — TUBING IV EXTENSION SET 34"

## (undated) DEVICE — SYR 10ML LL W/O NDL

## (undated) DEVICE — ESU ELEC BLADE 6" COATED/INSULATED E1455-6

## (undated) DEVICE — PACK SPINE SM CUSTOM SNE15SSFSK

## (undated) DEVICE — POSITIONER PT PRONESAFE HEAD REST W/DERMAPROX INSERT 40599

## (undated) DEVICE — SPONGE SURGIFOAM 50

## (undated) DEVICE — SYR 05ML LL W/O NDL

## (undated) DEVICE — DRAPE MAYO STAND 23X54 8337

## (undated) DEVICE — DRSG KERLIX FLUFFS X5

## (undated) DEVICE — TOOL DISSECT MIDAS MR8 14CM MATCH HEAD 3MM MR8-14MH30

## (undated) DEVICE — RX SURGIFLO HEMOSTATIC MATRIX W/THROMBIN 8ML 2994

## (undated) DEVICE — ESU GROUND PAD UNIVERSAL W/O CORD

## (undated) DEVICE — DRAPE MICROSCOPE LEICA 54X150" AR8033650

## (undated) DEVICE — PREP CHLORAPREP 26ML TINTED ORANGE  260815

## (undated) DEVICE — GLOVE BIOGEL PI MICRO INDICATOR UNDERGLOVE SZ 8.0 48980

## (undated) DEVICE — DRAPE COVER C-ARM SEAMLESS SNAP-KAP 03-KP26 LATEX FREE

## (undated) DEVICE — SOL WATER IRRIG 1000ML BOTTLE 2F7114

## (undated) DEVICE — PREP DURAPREP 26ML APL 8630

## (undated) DEVICE — TRAY EPDRL 3.5IN 17GA 19GA PRFX BPA DEHP 332079

## (undated) DEVICE — ANTIFOG SOLUTION W/FOAM PAD 31142527

## (undated) RX ORDER — METHYLPREDNISOLONE ACETATE 40 MG/ML
INJECTION, SUSPENSION INTRA-ARTICULAR; INTRALESIONAL; INTRAMUSCULAR; SOFT TISSUE
Status: DISPENSED
Start: 2024-05-03

## (undated) RX ORDER — HYDROMORPHONE HYDROCHLORIDE 1 MG/ML
INJECTION, SOLUTION INTRAMUSCULAR; INTRAVENOUS; SUBCUTANEOUS
Status: DISPENSED
Start: 2024-05-03

## (undated) RX ORDER — DEXAMETHASONE SODIUM PHOSPHATE 4 MG/ML
INJECTION, SOLUTION INTRA-ARTICULAR; INTRALESIONAL; INTRAMUSCULAR; INTRAVENOUS; SOFT TISSUE
Status: DISPENSED
Start: 2024-05-03

## (undated) RX ORDER — CLINDAMYCIN PHOSPHATE 900 MG/50ML
INJECTION, SOLUTION INTRAVENOUS
Status: DISPENSED
Start: 2024-05-03

## (undated) RX ORDER — EPINEPHRINE 1 MG/ML
INJECTION, SOLUTION INTRAMUSCULAR; SUBCUTANEOUS
Status: DISPENSED
Start: 2024-05-03

## (undated) RX ORDER — GABAPENTIN 300 MG/1
CAPSULE ORAL
Status: DISPENSED
Start: 2024-05-03

## (undated) RX ORDER — BUPIVACAINE HYDROCHLORIDE 5 MG/ML
INJECTION, SOLUTION EPIDURAL; INTRACAUDAL
Status: DISPENSED
Start: 2024-05-03

## (undated) RX ORDER — ONDANSETRON 2 MG/ML
INJECTION INTRAMUSCULAR; INTRAVENOUS
Status: DISPENSED
Start: 2024-05-03

## (undated) RX ORDER — PROPOFOL 10 MG/ML
INJECTION, EMULSION INTRAVENOUS
Status: DISPENSED
Start: 2024-05-03

## (undated) RX ORDER — CEFAZOLIN SODIUM/WATER 2 G/20 ML
SYRINGE (ML) INTRAVENOUS
Status: DISPENSED
Start: 2024-05-03